# Patient Record
Sex: MALE | Race: WHITE | NOT HISPANIC OR LATINO | Employment: FULL TIME | URBAN - METROPOLITAN AREA
[De-identification: names, ages, dates, MRNs, and addresses within clinical notes are randomized per-mention and may not be internally consistent; named-entity substitution may affect disease eponyms.]

---

## 2018-01-11 ENCOUNTER — ALLSCRIPTS OFFICE VISIT (OUTPATIENT)
Dept: OTHER | Facility: OTHER | Age: 53
End: 2018-01-11

## 2018-01-11 ENCOUNTER — GENERIC CONVERSION - ENCOUNTER (OUTPATIENT)
Dept: OTHER | Facility: OTHER | Age: 53
End: 2018-01-11

## 2018-01-11 ENCOUNTER — TRANSCRIBE ORDERS (OUTPATIENT)
Dept: ADMINISTRATIVE | Facility: HOSPITAL | Age: 53
End: 2018-01-11

## 2018-01-11 DIAGNOSIS — F17.209 NICOTINE DEPENDENCE WITH NICOTINE-INDUCED DISORDER, UNSPECIFIED NICOTINE PRODUCT TYPE: ICD-10-CM

## 2018-01-11 DIAGNOSIS — G47.30 SLEEP APNEA, UNSPECIFIED TYPE: Primary | ICD-10-CM

## 2018-01-11 DIAGNOSIS — E66.9 OBESITY, UNSPECIFIED CLASSIFICATION, UNSPECIFIED OBESITY TYPE, UNSPECIFIED WHETHER SERIOUS COMORBIDITY PRESENT: ICD-10-CM

## 2018-01-15 ENCOUNTER — ALLSCRIPTS OFFICE VISIT (OUTPATIENT)
Dept: OTHER | Facility: OTHER | Age: 53
End: 2018-01-15

## 2018-01-23 VITALS
BODY MASS INDEX: 46.65 KG/M2 | HEART RATE: 76 BPM | WEIGHT: 315 LBS | TEMPERATURE: 99.1 F | DIASTOLIC BLOOD PRESSURE: 60 MMHG | SYSTOLIC BLOOD PRESSURE: 84 MMHG | RESPIRATION RATE: 16 BRPM | HEIGHT: 69 IN

## 2018-01-23 NOTE — MISCELLANEOUS
Message  Phone call to patient spoke with his wife who was with him in the office yesterday during his appointment  She states that patient is feeling better today since holding lisinopril and HCTZ  He denies lightheadedness or fatigue  Patient has moved up his appointment with cardiologist to this Friday 01/19/2018        Signatures   Electronically signed by : Hubert Kim DO; Jan 16 2018 10:45AM EST                       (Author)

## 2018-01-24 VITALS — TEMPERATURE: 98.3 F | BODY MASS INDEX: 46.65 KG/M2 | HEIGHT: 69 IN | WEIGHT: 315 LBS

## 2018-01-24 VITALS — DIASTOLIC BLOOD PRESSURE: 68 MMHG | RESPIRATION RATE: 18 BRPM | SYSTOLIC BLOOD PRESSURE: 110 MMHG | HEART RATE: 76 BPM

## 2018-02-11 PROBLEM — I21.3 STEMI (ST ELEVATION MYOCARDIAL INFARCTION) (HCC): Status: ACTIVE | Noted: 2018-01-09

## 2018-02-11 PROBLEM — I48.91 ATRIAL FIBRILLATION, NEW ONSET (HCC): Status: ACTIVE | Noted: 2018-02-11

## 2018-02-11 PROBLEM — I10 HYPERTENSION: Status: ACTIVE | Noted: 2018-01-11

## 2018-02-11 PROBLEM — G47.30 SLEEP APNEA: Status: ACTIVE | Noted: 2018-01-11

## 2018-02-11 PROBLEM — F17.200 NICOTINE DEPENDENCE: Status: ACTIVE | Noted: 2018-01-11

## 2018-02-20 ENCOUNTER — TELEPHONE (OUTPATIENT)
Dept: FAMILY MEDICINE CLINIC | Facility: CLINIC | Age: 53
End: 2018-02-20

## 2018-02-20 NOTE — TELEPHONE ENCOUNTER
As an FYI: Kwesi Boyce called stating Amado Antony had a heart attack about 3 weeks ago and currently has a heavy feeling on the chest as well as an elevated BP in the 180's  I suggested that if he feels a heavy feeling on the chest as well as trouble breathing they should go to the ER

## 2018-02-23 ENCOUNTER — TRANSITIONAL CARE MANAGEMENT (OUTPATIENT)
Dept: FAMILY MEDICINE CLINIC | Facility: CLINIC | Age: 53
End: 2018-02-23

## 2018-02-26 NOTE — MISCELLANEOUS
Assessment    1  Hypertension (401 9) (I10)   2  STEMI (ST elevation myocardial infarction) (410 90) (I21 3)   3  Nicotine dependence (305 1) (F17 200)   4  Obesity (278 00) (E66 9)   5  Sleep apnea (780 57) (G47 30)    Plan  Nicotine dependence    · Chantix Continuing Month Christian 1 MG Oral Tablet; TAKE AS DIRECTED PER  PACKAGE INSTRUCTIONS   Rx By: Jose Luis Ortez; Dispense: 0 Days ; #:1 X 56 Tablet Disp Pack; Refill: 4; For: Nicotine dependence; MARIETTA = N; Verified Transmission to [facility]; Last Updated By: System, SureScripts; 1/11/2018 4:09:03 PM   · Chantix Starting Month Christian 0 5 MG X 11 & 1 MG X 42 Oral Tablet; TAKE AS  DIRECTED PER PACKAGE INSTRUCTIONS   Rx By: Jose Luis Ortez; Dispense: 0 Days ; #:1 X 53 Tablet Disp Pack; Refill: 0; For: Nicotine dependence; MARIETTA = N; Verified Transmission to [facility] #[facility]; Last Updated By: System, SureScripts; 1/11/2018 4:09:03 PM  Nicotine dependence, Obesity, Sleep apnea    · *Polysomnography, Sleep Study, Diagnostic; Status:Need Information - Financial  Authorization; Requested PIW:[id number]; Perform:[facility]; VSW:[id number];JUAN;  For:Nicotine dependence, Obesity, Sleep apnea; Ordered By:Matthew Weldon;  there any other medical conditions or medications that would explain these      symptoms? : No  is the sleep disturbance affecting the patient's ability to function? : fatigue  History/Symptoms: : snoring, apnea, choking  Study Only or Consult : Sleep Study and Consult and F/U with Sleep Specialist    Discussion/Summary  Discussion Summary:   Patient will be scheduled for sleep study  Patient was started on Chantix  Patient to continue present treatment  Patient instructed to follow a low-fat, low-salt and a low-carbohydrate diet and get regular exercise walking as tolerated  Weight loss strongly encouraged and patient encouraged to continue off cigarettes  Discussed lifestyle changes and patient states he is motivated   Patient to follow up with Cardiology and pulmonology as scheduled and return to the office in 3 months  Medication SE Review and Pt Understands Tx: Possible side effects of new medications were reviewed with the patient/guardian today  The treatment plan was reviewed with the patient/guardian  The patient/guardian understands and agrees with the treatment plan      Chief Complaint  Chief Complaint Free Text Note Form: Hospital Follow Up      History of Present Illness  TCM Communication [facility]: The patient is being contacted for follow-up after hospitalization and [facility]  He was hospitalized at [facility]  The dates of hospitalization: January 4, 2018 through January 8,2018, date of admission: January 4, 2018, date of discharge: January 8, 2018  Diagnosis: Heart attack, STEMI, HTN  He was discharged to home  Medications reviewed and updated today  He scheduled a follow up appointment  Follow-up appointments with other specialists: Cardiol 1/22/18, Pulm 3/2/18  Symptoms: cough, back pain left side and back pain right side, but no fever, no weakness, no dizziness, no headache, no fatigue, no shortness of breath, no chest pain, no arm pain left side, no arm pain on right side, no leg pain on left side, no leg pain on right side, no upper abdominal pain, no middle abdominal pain, no lower abdominal pain, no rash:, no anorexia, no nausea, no vomiting, no loose stools, no constipation, no pain with urinating and no swelling  The patient is currently experiencing symptoms  Counseling was provided to the patient  Topics counseled included importance of compliance with treatment  Communication performed and completed by Kaur Foote   HPI: Patient is being seen in follow-up from recent hospitalization at [facility] from 01/04/2018 until 01/08/2018 for STEMI and hypertension  Patient had cardiac catheterization performed but stent was unable to be placed   Patient was told he was screened for sleep apnea and tested positive and was recommended to have a sleep study as an outpatient  Patient had been smoking 2 packs of cigarettes per day and stop cold turkey and was not offered nicotine patches or Chantix  He admits to feeling irritable and requests prescription for Chantix  Patient has a follow-up appointment with Cardiology on 01/22/2018 and they discussed cardiac rehab  Patient has a follow-up appointment with pulmonology on 03/02/2018 and a request sleep study prior to appointment  Active Problems    1  Bilateral edema of lower extremity (782 3) (R60 0)   2  Cutaneous skin tags (701 9) (L91 8)   3  Elbow contusion (923 11) (S50 00XA)   4  Elbow pain, right (719 42) (M25 521)   5  Elbow swelling (719 02) (M25 429)   6  Esophageal reflux (530 81) (K21 9)   7  Laceration of left knee, initial encounter (891 0) (S81 012A)   8  Lumbago (724 2) (M54 5)   9  Median nerve neuritis (354 1) (G56 10)   10  Obesity (278 00) (E66 9)   11  Radial neuritis (723 4) (G56 30)   12  Wound of skin (782 9) (R23 8)    Family History  Father    1  No pertinent family history    Social History    · Current Every Day Smoker (305 1)   · Former smoker (V15 82) (R10 066)    Current Meds   1  Albuterol 90 MCG/ACT AERS; Therapy: (Recorded:11Jan2018) to Recorded   2  Aspirin 81 MG Oral Tablet Chewable; CHEW 1 TABLET DAILY; Therapy: (Recorded:11Jan2018) to Recorded   3  Atorvastatin Calcium 80 MG Oral Tablet; Take 1 tablet daily; Therapy: (Recorded:11Jan2018) to Recorded   4  Carvedilol 12 5 MG Oral Tablet; Take 1 tablet twice daily with meals; Therapy: (Recorded:11Jan2018) to Recorded   5  Clopidogrel Bisulfate 75 MG Oral Tablet; Take 1 tablet daily; Therapy: (Recorded:11Jan2018) to Recorded   6  Fluticasone-Salmeterol 250-50 MCG/DOSE MISC; INHALE 1 PUFFS Twice daily; Therapy: (Recorded:11Jan2018) to Recorded   7  HydroCHLOROthiazide 25 MG Oral Tablet; Take 1 tablet daily;    Therapy: (Recorded:11Jan2018) to Recorded   8  Lisinopril 20 MG Oral Tablet; Take 1 tablet daily; Therapy: (Recorded:11Jan2018) to Recorded   9  Motrin  MG Oral Tablet; Therapy: (Recorded:05Mar2014) to Recorded   10  Nitroglycerin 0 4 MG Sublingual Tablet Sublingual; place 1 tablet under the tongue every    5 minutes up to 3 doses as needed for chest pain; Therapy: (Recorded:11Jan2018) to Recorded   11  Omeprazole 20 MG Oral Capsule Delayed Release; TAKE 1 CAPSULE Daily; Therapy: [id number] to (Evaluate:88Gsr6457)  Requested for: 41FXS6947; Last    Rx:05Mar2014 Ordered    Allergies    1  No Known Drug Allergies    Vitals  Signs   Recorded: 01CCO8240 04:02PM   Heart Rate: 76  Respiration: 18  Systolic: 357  Diastolic: 68  BP Cuff Size: Large  Recorded: 93TFQ4729 03:32PM   Temperature: 98 3 F  Height: 5 ft 9 in  Weight: 358 lb   BMI Calculated: 52 87  BSA Calculated: 2 65    Physical Exam    Constitutional   General appearance: No acute distress, well appearing and well nourished  Eyes   Conjunctiva and lids: No swelling, erythema, or discharge  Ears, Nose, Mouth, and Throat   External inspection of ears and nose: Normal     Otoscopic examination: Tympanic membrance translucent with normal light reflex  Canals patent without erythema  Nasal mucosa, septum, and turbinates: Normal without edema or erythema  Oropharynx: Normal with no erythema, edema, exudate or lesions  Pulmonary   Respiratory effort: No increased work of breathing or signs of respiratory distress  Auscultation of lungs: Clear to auscultation, equal breath sounds bilaterally, no wheezes, no rales, no rhonci  Cardiovascular   Auscultation of heart: Normal rate and rhythm, normal S1 and S2, without murmurs  Examination of extremities for edema and/or varicosities: Normal     Carotid pulses: Normal     Abdomen   Abdomen: Non-tender, no masses  The abdomen was obese     Lymphatic   Palpation of lymph nodes in neck: No lymphadenopathy  Musculoskeletal   Gait and station: Normal     Inspection/palpation of joints, bones, and muscles: Normal     Skin   Skin and subcutaneous tissue: Normal without rashes or lesions  Psychiatric   Orientation to person, place and time: Normal     Mood and affect: Normal          Results/Data  PHQ-2 Adult Depression Screening 78SDC2409 03:41PM User, s     Test Name Result Flag Reference   PHQ-2 Adult Depression Score 0     Over the last two weeks, how often have you been bothered by any of the following problems?   Little interest or pleasure in doing things: Not at all - 0  Feeling down, depressed, or hopeless: Not at all - 0   PHQ-2 Adult Depression Screening Negative         Signatures   Electronically signed by : Nelli Saavedra DO; Jan 11 2018  4:32PM EST                       (Author)

## 2018-02-28 ENCOUNTER — OFFICE VISIT (OUTPATIENT)
Dept: FAMILY MEDICINE CLINIC | Facility: CLINIC | Age: 53
End: 2018-02-28
Payer: COMMERCIAL

## 2018-02-28 VITALS
HEART RATE: 64 BPM | HEIGHT: 68 IN | TEMPERATURE: 99.1 F | DIASTOLIC BLOOD PRESSURE: 62 MMHG | SYSTOLIC BLOOD PRESSURE: 102 MMHG

## 2018-02-28 DIAGNOSIS — I48.91 ATRIAL FIBRILLATION, NEW ONSET (HCC): Primary | ICD-10-CM

## 2018-02-28 DIAGNOSIS — G47.33 OBSTRUCTIVE SLEEP APNEA SYNDROME: ICD-10-CM

## 2018-02-28 DIAGNOSIS — I10 ESSENTIAL HYPERTENSION: ICD-10-CM

## 2018-02-28 PROCEDURE — 99496 TRANSJ CARE MGMT HIGH F2F 7D: CPT | Performed by: FAMILY MEDICINE

## 2018-02-28 RX ORDER — ATORVASTATIN CALCIUM 80 MG/1
1 TABLET, FILM COATED ORAL DAILY
COMMUNITY

## 2018-02-28 RX ORDER — CARVEDILOL 12.5 MG/1
12.5 TABLET ORAL 2 TIMES DAILY
COMMUNITY
Start: 2018-02-22

## 2018-02-28 RX ORDER — NITROGLYCERIN 0.4 MG/1
1 TABLET SUBLINGUAL
COMMUNITY

## 2018-02-28 RX ORDER — SOTALOL HYDROCHLORIDE 120 MG/1
120 TABLET ORAL 2 TIMES DAILY
COMMUNITY
Start: 2018-02-22 | End: 2021-06-14

## 2018-02-28 RX ORDER — LISINOPRIL 5 MG/1
5 TABLET ORAL DAILY
COMMUNITY
Start: 2018-02-23 | End: 2018-11-12

## 2018-02-28 RX ORDER — CLOPIDOGREL BISULFATE 75 MG/1
1 TABLET ORAL DAILY
COMMUNITY

## 2018-03-01 NOTE — PROGRESS NOTES
Assessment/Plan:  Patient to continue present treatment  Patient to follow a low-fat, low-salt and a low-carbohydrate diet and get regular exercise walking as tolerated  Weight loss encouraged  Patient to follow up with cardiologist later this week as scheduled and scheduled for sleep study next week  Patient to return to the office p r n  No problem-specific Assessment & Plan notes found for this encounter  Diagnoses and all orders for this visit:    Atrial fibrillation, new onset [facility])  Comments:  Continue present treatment and follow up with Cardiology this week as scheduled  Essential hypertension  Comments:  BP well controlled  Continue present treatment  Obstructive sleep apnea syndrome  Comments:  Patient scheduled for sleep study next week  Recommend weight loss  Other orders  -     apixaban (ELIQUIS) 5 mg; Take 5 mg by mouth 2 (two) times a day  -     atorvastatin (LIPITOR) 80 mg tablet; Take 1 tablet by mouth daily  -     carvedilol (COREG) 12 5 mg tablet; Take 12 5 mg by mouth 2 (two) times a day  -     clopidogrel (PLAVIX) 75 mg tablet; Take 1 tablet by mouth daily  -     lisinopril (ZESTRIL) 5 mg tablet; Take 5 mg by mouth daily  -     nitroglycerin (NITROSTAT) 0 4 mg SL tablet; Place 1 tablet under the tongue every 5 (five) minutes as needed Chest pain  -     sotalol (BETAPACE) 120 mg tablet; Take 120 mg by mouth 2 (two) times a day          Subjective:      Patient ID: Lora Gonzales is a 48 y o  male  Patient is here for follow-up appointment from recent hospitalization at [facility] from 02/20/2018 until 02/22/2018 for recurrent atrial fibrillation  Patient received cardioversion for the 2nd time and started on Betapace with resolution of AFib currently  Patient has been feeling significantly better overall and return to work today  Patient has a follow-up appointment with Cardiology on 03/02/2018 and is scheduled for sleep study next week        Date and time hospital follow up call was made:  2/23/2018  1:54 PM  Patient was hopsitalized at:  formerly [facility]  Date of admission:  2/19/18  Date of discharge:  2/22/18  Diagnosis:  afib  Were the patients medicaitons reviewed and updated:  No  Scheduled for follow up?:  Yes  Is transportation to your appointments needed:  No  I have advised the patient to call PCP with any new or worsening symptoms (please type in name along with any credentials):  Issa Fofana  Counseling:  Patient         The following portions of the patient's history were reviewed and updated as appropriate: allergies, current medications, past family history, past medical history, past social history, past surgical history and problem list     Review of Systems   Constitutional: Positive for activity change  Negative for appetite change, fatigue and unexpected weight change  HENT: Negative  Eyes: Negative  Respiratory: Negative for cough, chest tightness, shortness of breath and wheezing  Cardiovascular: Negative for chest pain, palpitations and leg swelling  Gastrointestinal: Negative for abdominal pain, blood in stool, constipation, diarrhea and nausea  Genitourinary: Positive for urgency  Negative for difficulty urinating, dysuria, frequency and hematuria  Musculoskeletal: Positive for back pain  Negative for arthralgias, gait problem, joint swelling, myalgias and neck pain  Neurological: Negative for dizziness, syncope, light-headedness, numbness and headaches  Hematological: Negative for adenopathy  Does not bruise/bleed easily  Psychiatric/Behavioral: The patient is nervous/anxious  Objective:      /62 (BP Location: Left arm, Patient Position: Sitting, Cuff Size: Large)   Pulse 64   Temp 99 1 °F (37 3 °C) (Tympanic)   Ht 5' 8" (1 727 m)          Physical Exam   Constitutional: He is oriented to person, place, and time  He appears well-developed and well-nourished  No distress  HENT:   Head: Normocephalic  Mouth/Throat: Oropharynx is clear and moist    Eyes: Conjunctivae are normal    Neck: Neck supple  Cardiovascular: Normal rate and regular rhythm  Pulmonary/Chest: Effort normal and breath sounds normal    Abdominal: Soft  There is no tenderness  Musculoskeletal: He exhibits no edema  Lymphadenopathy:     He has no cervical adenopathy  Neurological: He is alert and oriented to person, place, and time  Skin: Skin is warm and dry  Psychiatric: He has a normal mood and affect

## 2018-03-01 NOTE — PATIENT INSTRUCTIONS
Continue present treatment  Follow a low-fat, low-salt and a low-carbohydrate diet and get regular exercise walking as tolerated  Weight loss encouraged  Follow up with Cardiology as scheduled and 4 year sleep study next week

## 2018-11-12 ENCOUNTER — OFFICE VISIT (OUTPATIENT)
Dept: FAMILY MEDICINE CLINIC | Facility: CLINIC | Age: 53
End: 2018-11-12
Payer: COMMERCIAL

## 2018-11-12 VITALS
OXYGEN SATURATION: 98 % | SYSTOLIC BLOOD PRESSURE: 130 MMHG | DIASTOLIC BLOOD PRESSURE: 90 MMHG | HEART RATE: 80 BPM | HEIGHT: 68 IN | TEMPERATURE: 99.2 F | RESPIRATION RATE: 16 BRPM | BODY MASS INDEX: 53.98 KG/M2

## 2018-11-12 DIAGNOSIS — R35.1 BENIGN PROSTATIC HYPERPLASIA WITH NOCTURIA: ICD-10-CM

## 2018-11-12 DIAGNOSIS — J20.9 ACUTE BRONCHITIS, UNSPECIFIED ORGANISM: Primary | ICD-10-CM

## 2018-11-12 DIAGNOSIS — N40.1 BENIGN PROSTATIC HYPERPLASIA WITH NOCTURIA: ICD-10-CM

## 2018-11-12 PROBLEM — R77.8 ELEVATED TROPONIN: Status: ACTIVE | Noted: 2018-09-12

## 2018-11-12 PROBLEM — I21.11 ST ELEVATION MYOCARDIAL INFARCTION INVOLVING RIGHT CORONARY ARTERY (HCC): Status: ACTIVE | Noted: 2018-01-09

## 2018-11-12 PROBLEM — I25.10 CORONARY ARTERY DISEASE INVOLVING NATIVE CORONARY ARTERY OF NATIVE HEART WITHOUT ANGINA PECTORIS: Status: ACTIVE | Noted: 2018-01-23

## 2018-11-12 PROBLEM — I20.8 ANGINA AT REST (HCC): Status: ACTIVE | Noted: 2018-02-20

## 2018-11-12 PROBLEM — I48.0 PAF (PAROXYSMAL ATRIAL FIBRILLATION) (HCC): Status: ACTIVE | Noted: 2018-02-11

## 2018-11-12 PROBLEM — F17.200 TOBACCO USE DISORDER: Status: ACTIVE | Noted: 2018-01-04

## 2018-11-12 PROBLEM — R79.89 ELEVATED TROPONIN: Status: ACTIVE | Noted: 2018-09-12

## 2018-11-12 PROBLEM — I20.89 ANGINA AT REST: Status: ACTIVE | Noted: 2018-02-20

## 2018-11-12 PROCEDURE — 99213 OFFICE O/P EST LOW 20 MIN: CPT | Performed by: FAMILY MEDICINE

## 2018-11-12 PROCEDURE — 1036F TOBACCO NON-USER: CPT | Performed by: FAMILY MEDICINE

## 2018-11-12 RX ORDER — TAMSULOSIN HYDROCHLORIDE 0.4 MG/1
0.4 CAPSULE ORAL
Qty: 90 CAPSULE | Refills: 3 | Status: SHIPPED | OUTPATIENT
Start: 2018-11-12 | End: 2021-06-14

## 2018-11-12 RX ORDER — METHYLPREDNISOLONE 4 MG/1
TABLET ORAL
Qty: 21 TABLET | Refills: 0 | Status: SHIPPED | OUTPATIENT
Start: 2018-11-12 | End: 2019-08-30 | Stop reason: ALTCHOICE

## 2018-11-12 RX ORDER — TAMSULOSIN HYDROCHLORIDE 0.4 MG/1
0.4 CAPSULE ORAL
COMMUNITY
Start: 2018-09-12 | End: 2018-11-12 | Stop reason: SDUPTHER

## 2018-11-12 RX ORDER — AZITHROMYCIN 250 MG/1
TABLET, FILM COATED ORAL
Qty: 6 TABLET | Refills: 0 | Status: SHIPPED | OUTPATIENT
Start: 2018-11-12 | End: 2018-11-16

## 2018-11-12 RX ORDER — LISINOPRIL 10 MG/1
TABLET ORAL
COMMUNITY
Start: 2018-10-29

## 2018-11-12 NOTE — PROGRESS NOTES
Assessment/Plan:      Patient will be started on a Z-Christian and Medrol Dosepak  Patient may take Mucinex DM p r n  He is encouraged to drink plenty of fluids and rest   Return to the office in 1 week or sooner p r n  [facility] Diagnoses and all orders for this visit:    Acute bronchitis, unspecified organism  Comments:    Z-Christian and Medrol Dosepak  Mucinex DM p r n   Increase fluids and rest   Orders:  -     azithromycin (ZITHROMAX) 250 mg tablet; Take 2 tablets today then 1 tablet daily x 4 days  -     Methylprednisolone 4 MG TBPK; Use as directed on package    Benign prostatic hyperplasia with nocturia  -     tamsulosin (FLOMAX) 0 4 mg; Take 1 capsule (0 4 mg total) by mouth daily with dinner    Other orders  -     lisinopril (ZESTRIL) 10 mg tablet;   -     Discontinue: tamsulosin (FLOMAX) 0 4 mg; Take 0 4 mg by mouth          Subjective:      Patient ID: Radha Yanes is a 48 y o  male  Patient started 1 week ago with head cold symptoms  He treated this with Coricidin HBP with some improvement  Past couple days complains of chest cold with cough productive of yellow-green mucus and wheezing  URI    This is a new problem  The current episode started in the past 7 days  The problem has been gradually worsening  There has been no fever  Associated symptoms include congestion, coughing and wheezing  Pertinent negatives include no ear pain, headaches, plugged ear sensation, rhinorrhea, sinus pain, sneezing or sore throat  Treatments tried: corisidan HBP  The treatment provided mild relief  The following portions of the patient's history were reviewed and updated as appropriate: allergies, current medications, past family history, past medical history, past social history, past surgical history and problem list     Review of Systems   HENT: Positive for congestion  Negative for ear pain, rhinorrhea, sinus pain, sneezing and sore throat  Respiratory: Positive for cough and wheezing      Neurological: Negative for headaches  Objective:      /90   Pulse 80   Temp 99 2 °F (37 3 °C) (Tympanic)   Resp 16   Ht 5' 8" (1 727 m)   SpO2 98%   BMI 53 98 kg/m²          Physical Exam   Constitutional: He is oriented to person, place, and time  He appears well-developed and well-nourished  No distress  HENT:   Head: Normocephalic  Right Ear: External ear normal    Left Ear: External ear normal      Positive turbinates swelling with mucoid drainage  Throat postnasal drainage and injected  Eyes: Conjunctivae are normal  No scleral icterus  Neck: Neck supple  Cardiovascular: Normal rate and regular rhythm  Pulmonary/Chest: Effort normal  No respiratory distress  He has wheezes  Scattered expiratory wheezes  Abdominal: Soft  There is no tenderness  Musculoskeletal: He exhibits no edema  Lymphadenopathy:     He has no cervical adenopathy  Neurological: He is alert and oriented to person, place, and time  Skin: Skin is warm and dry  Psychiatric: He has a normal mood and affect

## 2019-01-10 ENCOUNTER — TELEPHONE (OUTPATIENT)
Dept: FAMILY MEDICINE CLINIC | Facility: CLINIC | Age: 54
End: 2019-01-10

## 2019-01-10 DIAGNOSIS — Z72.0 TOBACCO ABUSE DISORDER: Primary | ICD-10-CM

## 2019-01-10 NOTE — TELEPHONE ENCOUNTER
Called patient and left message that colon cancer screening is due; it is recommended to have a colonoscopy every 10 years or to have a FIT test annually  Please call the office for a referral for colonoscopy or stop by office to  a FIT kit, which can be done at home with only one sample and then sent to a lab for processing

## 2019-01-11 DIAGNOSIS — F17.200 TOBACCO USE DISORDER: Primary | ICD-10-CM

## 2019-03-01 ENCOUNTER — TRANSITIONAL CARE MANAGEMENT (OUTPATIENT)
Dept: FAMILY MEDICINE CLINIC | Facility: CLINIC | Age: 54
End: 2019-03-01

## 2019-03-16 PROBLEM — R77.8 ELEVATED TROPONIN: Status: RESOLVED | Noted: 2018-09-12 | Resolved: 2019-03-16

## 2019-03-16 PROBLEM — R79.89 ELEVATED TROPONIN: Status: RESOLVED | Noted: 2018-09-12 | Resolved: 2019-03-16

## 2019-08-30 ENCOUNTER — OFFICE VISIT (OUTPATIENT)
Dept: FAMILY MEDICINE CLINIC | Facility: CLINIC | Age: 54
End: 2019-08-30
Payer: COMMERCIAL

## 2019-08-30 VITALS
SYSTOLIC BLOOD PRESSURE: 132 MMHG | TEMPERATURE: 97.8 F | HEIGHT: 69 IN | BODY MASS INDEX: 46.65 KG/M2 | DIASTOLIC BLOOD PRESSURE: 88 MMHG | WEIGHT: 315 LBS

## 2019-08-30 DIAGNOSIS — I48.0 PAF (PAROXYSMAL ATRIAL FIBRILLATION) (HCC): ICD-10-CM

## 2019-08-30 DIAGNOSIS — M54.5 LOW BACK PAIN, UNSPECIFIED BACK PAIN LATERALITY, UNSPECIFIED CHRONICITY, WITH SCIATICA PRESENCE UNSPECIFIED: Primary | ICD-10-CM

## 2019-08-30 DIAGNOSIS — I25.10 CORONARY ARTERY DISEASE INVOLVING NATIVE CORONARY ARTERY OF NATIVE HEART WITHOUT ANGINA PECTORIS: ICD-10-CM

## 2019-08-30 PROCEDURE — 3008F BODY MASS INDEX DOCD: CPT | Performed by: FAMILY MEDICINE

## 2019-08-30 PROCEDURE — 99214 OFFICE O/P EST MOD 30 MIN: CPT | Performed by: FAMILY MEDICINE

## 2019-08-30 RX ORDER — FUROSEMIDE 40 MG/1
40 TABLET ORAL 2 TIMES DAILY
COMMUNITY
Start: 2019-06-19 | End: 2020-06-18

## 2019-08-30 RX ORDER — PREDNISONE 10 MG/1
TABLET ORAL
Qty: 33 TABLET | Refills: 0 | Status: SHIPPED | OUTPATIENT
Start: 2019-08-30 | End: 2020-12-02

## 2019-08-30 RX ORDER — CYCLOBENZAPRINE HCL 10 MG
10 TABLET ORAL
Qty: 10 TABLET | Refills: 0 | Status: SHIPPED | OUTPATIENT
Start: 2019-08-30 | End: 2021-06-14

## 2019-08-30 NOTE — LETTER
Date: 8/30/2019    To whom it may concern: This is to certify that Lidia Oro has been under my care for the following diagnosis: Coronary artery disease  I feel that he is unable to serve on Jury Duty at this time for the above mentioned medical reasons                    Sincerely,   Audrey Dejesus DO

## 2019-08-30 NOTE — PROGRESS NOTES
Assessment/Plan:  Recommend physical therapy if no improvement in symptoms  Consider lumbar x-ray if needed or MRI if symptoms persist or worsen  We also discussed with patient and his wife possibly considering evaluation for weight reduction surgery considering obesity and comorbid conditions  He would need to be cleared by Cardiology prior to doing so  Recommend follow-up with our office next week if no improvement or worsening symptoms and he will call if any new symptoms develop in the interim  Note provided for jury duty today  See chart copy  No problem-specific Assessment & Plan notes found for this encounter  Diagnoses and all orders for this visit:    Low back pain, unspecified back pain laterality, unspecified chronicity, with sciatica presence unspecified  -     predniSONE 10 mg tablet; 6 tablets daily, all at one time, with food  Then on day #4 decrease by 1 pill each day until finished  -     cyclobenzaprine (FLEXERIL) 10 mg tablet; Take 1 tablet (10 mg total) by mouth daily at bedtime    Coronary artery disease involving native coronary artery of native heart without angina pectoris    [facility]    Other orders  -     apixaban (ELIQUIS) 5 mg; Take 1 tablet by mouth 2 (two) times a day  -     furosemide (LASIX) 40 mg tablet; Take 40 mg by mouth 2 (two) times a day          Subjective:      Patient ID: Tiesha Jj is a 47 y o  male  Patient is here with chief complaint of low back pain intermittently over the last several weeks  Symptoms are mild-to-moderate  He does have occasional radiculopathy symptoms to the right-hand side  Pain occasionally radiates to the right gluteal area and occasionally to the right lateral upper leg  Denies any leg weakness  No bowel or bladder incontinence  He does have history of cardiovascular disease and currently is taking Eliquis  He is requesting a letter for jury duty    He is scheduled to sit for jury duty on 09/03/2019  He does not believe he will be able to do so secondary to his cardiovascular issues  He has a difficult time sitting for prolonged periods and is currently on a diuretic  The following portions of the patient's history were reviewed and updated as appropriate: allergies, current medications, past family history, past medical history, past social history, past surgical history and problem list     Review of Systems   Constitutional: Negative  HENT: Negative  Eyes: Negative  Respiratory: Negative  Cardiovascular: Negative  Gastrointestinal: Negative  Endocrine: Negative  Genitourinary: Negative  Musculoskeletal: Positive for back pain  Skin: Negative  Allergic/Immunologic: Negative  Neurological: Negative  Hematological: Negative  Psychiatric/Behavioral: Negative  Objective:      /88 (BP Location: Left arm, Patient Position: Sitting, Cuff Size: Large)   Temp 97 8 °F (36 6 °C) (Tympanic)   Ht 5' 9" (1 753 m)   Wt (!) 173 kg (381 lb)   BMI 56 26 kg/m²          Physical Exam   Constitutional: He is oriented to person, place, and time  He appears well-developed and well-nourished  HENT:   Head: Normocephalic and atraumatic  Right Ear: External ear normal  Tympanic membrane is not erythematous and not bulging  Left Ear: External ear normal  Tympanic membrane is not erythematous and not bulging  Nose: Nose normal    Mouth/Throat: Oropharynx is clear and moist and mucous membranes are normal  No oral lesions  No oropharyngeal exudate  Eyes: Conjunctivae and EOM are normal  Right eye exhibits no discharge  Left eye exhibits no discharge  No scleral icterus  Neck: Normal range of motion  Neck supple  No thyromegaly present  Cardiovascular: Normal rate, regular rhythm and normal heart sounds  Exam reveals no gallop and no friction rub  No murmur heard  Pulmonary/Chest: Effort normal  No respiratory distress  He has no wheezes   He has no rales  He exhibits no tenderness  Abdominal: Soft  Bowel sounds are normal  He exhibits no distension and no mass  There is no tenderness  There is no rebound and no guarding  Musculoskeletal: Normal range of motion  He exhibits no edema, tenderness or deformity  Negative straight leg raise  Reflexes intact to patella and Achilles bilaterally  Lymphadenopathy:     He has no cervical adenopathy  Neurological: He is alert and oriented to person, place, and time  He has normal reflexes  No cranial nerve deficit  He exhibits normal muscle tone  Coordination normal    Skin: Skin is warm and dry  No rash noted  No erythema  No pallor  Psychiatric: He has a normal mood and affect  His behavior is normal    Vitals reviewed

## 2020-01-21 DIAGNOSIS — Z72.0 TOBACCO ABUSE DISORDER: ICD-10-CM

## 2020-12-01 ENCOUNTER — TELEPHONE (OUTPATIENT)
Dept: FAMILY MEDICINE CLINIC | Facility: CLINIC | Age: 55
End: 2020-12-01

## 2020-12-02 ENCOUNTER — OFFICE VISIT (OUTPATIENT)
Dept: URGENT CARE | Age: 55
End: 2020-12-02
Payer: COMMERCIAL

## 2020-12-02 ENCOUNTER — APPOINTMENT (OUTPATIENT)
Dept: RADIOLOGY | Age: 55
End: 2020-12-02
Payer: COMMERCIAL

## 2020-12-02 VITALS
SYSTOLIC BLOOD PRESSURE: 142 MMHG | OXYGEN SATURATION: 96 % | HEART RATE: 80 BPM | HEIGHT: 68 IN | RESPIRATION RATE: 22 BRPM | TEMPERATURE: 98 F | BODY MASS INDEX: 47.74 KG/M2 | WEIGHT: 315 LBS | DIASTOLIC BLOOD PRESSURE: 96 MMHG

## 2020-12-02 DIAGNOSIS — T14.90XA INJURY: ICD-10-CM

## 2020-12-02 DIAGNOSIS — S63.501A WRIST SPRAIN, RIGHT, INITIAL ENCOUNTER: Primary | ICD-10-CM

## 2020-12-02 PROCEDURE — 99213 OFFICE O/P EST LOW 20 MIN: CPT | Performed by: FAMILY MEDICINE

## 2020-12-02 PROCEDURE — 73110 X-RAY EXAM OF WRIST: CPT

## 2020-12-02 PROCEDURE — 73130 X-RAY EXAM OF HAND: CPT

## 2020-12-02 RX ORDER — PREDNISONE 50 MG/1
50 TABLET ORAL DAILY
Qty: 5 TABLET | Refills: 0 | Status: SHIPPED | OUTPATIENT
Start: 2020-12-02 | End: 2020-12-07

## 2021-03-01 ENCOUNTER — TELEPHONE (OUTPATIENT)
Dept: FAMILY MEDICINE CLINIC | Facility: CLINIC | Age: 56
End: 2021-03-01

## 2021-03-01 ENCOUNTER — TELEMEDICINE (OUTPATIENT)
Dept: FAMILY MEDICINE CLINIC | Facility: CLINIC | Age: 56
End: 2021-03-01
Payer: COMMERCIAL

## 2021-03-01 DIAGNOSIS — J06.9 UPPER RESPIRATORY TRACT INFECTION, UNSPECIFIED TYPE: Primary | ICD-10-CM

## 2021-03-01 PROCEDURE — 99213 OFFICE O/P EST LOW 20 MIN: CPT | Performed by: FAMILY MEDICINE

## 2021-03-01 RX ORDER — AZITHROMYCIN 250 MG/1
TABLET, FILM COATED ORAL
Qty: 6 TABLET | Refills: 0 | Status: SHIPPED | OUTPATIENT
Start: 2021-03-01 | End: 2021-03-05

## 2021-03-01 NOTE — PROGRESS NOTES
Virtual Brief Visit    Assessment/Plan:   discussed IUFDE-83 testing although patient declines at this time  Patient was started on a Z-Christian and may take Robitussin or Mucinex p r n  Recommend increase fluids and rest   Follow-up later this week or call sooner p r n   If symptoms persist again will recommend COVID-19 testing  Problem List Items Addressed This Visit     None      Visit Diagnoses     Upper respiratory tract infection, unspecified type    -  Primary    Relevant Medications    azithromycin (ZITHROMAX) 250 mg tablet                Reason for visit is No chief complaint on file  Encounter provider Shayne Fuller DO    Provider located at [address] [place] [address] [id number]    Recent Visits  No visits were found meeting these conditions  Showing recent visits within past 7 days and meeting all other requirements     Today's Visits  Date Type Provider Dept   03/01/21 Telemedicine Shayne Fuller DO [facility] [place]   Showing today's visits and meeting all other requirements     Future Appointments  No visits were found meeting these conditions  Showing future appointments within next 150 days and meeting all other requirements        After connecting through telephone, the patient was identified by name and date of birth  Dianelys Willard was informed that this is a telemedicine visit and that the visit is being conducted through telephone  My office door was closed  No one else was in the room  He acknowledged consent and understanding of privacy and security of the platform  The patient has agreed to participate and understands he can discontinue the visit at any time  Patient is aware this is a billable service  Subjective    Dianelys Willard is a 64 y o  male complains of nasal congestion, runny nose productive of clear to yellowish mucus for the past 1 week  He denies postnasal drainage, sore throat or earache    He admits to occasional cough productive of yellowish phlegm and wheezing  He denies shortness of breath  Patient denies headache or body aches  He denies fever, chills or sweats  Patient denies loss of sense of smell although admits to abnormal sense of taste since December of 2019 when he had a bad cold  Patient denies nausea, vomiting or diarrhea  He denies rash or red eyes  No recent travel or ill contacts or known COVID exposure  Patient's wife was positive for COVID in December of 2020  HPI     No past medical history on file  No past surgical history on file  Current Outpatient Medications   Medication Sig Dispense Refill    apixaban (ELIQUIS) 5 mg Take 1 tablet by mouth 2 (two) times a day      atorvastatin (LIPITOR) 80 mg tablet Take 1 tablet by mouth daily      azithromycin (ZITHROMAX) 250 mg tablet Take 2 tablets today then 1 tablet daily x 4 days 6 tablet 0    carvedilol (COREG) 12 5 mg tablet Take 12 5 mg by mouth 2 (two) times a day      clopidogrel (PLAVIX) 75 mg tablet Take 1 tablet by mouth daily      cyclobenzaprine (FLEXERIL) 10 mg tablet Take 1 tablet (10 mg total) by mouth daily at bedtime 10 tablet 0    fluticasone-salmeterol (ADVAIR, WIXELA) 250-50 mcg/dose inhaler INHALE ONE PUFF TWICE DAILY; rinse mouth after use 60 each 0    lisinopril (ZESTRIL) 10 mg tablet       nitroglycerin (NITROSTAT) 0 4 mg SL tablet Place 1 tablet under the tongue every 5 (five) minutes as needed Chest pain      sotalol (BETAPACE) 120 mg tablet Take 120 mg by mouth 2 (two) times a day      tamsulosin (FLOMAX) 0 4 mg Take 1 capsule (0 4 mg total) by mouth daily with dinner (Patient not taking: Reported on 8/30/2019) 90 capsule 3     No current facility-administered medications for this visit  No Known Allergies    Review of Systems    There were no vitals filed for this visit        I spent 15 minutes directly with the patient during this visit    VIRTUAL VISIT Kai Sanches acknowledges that he has consented to an online visit or consultation  He understands that the online visit is based solely on information provided by him, and that, in the absence of a face-to-face physical evaluation by the physician, the diagnosis he receives is both limited and provisional in terms of accuracy and completeness  This is not intended to replace a full medical face-to-face evaluation by the physician  Raine Caballero understands and accepts these terms

## 2021-03-01 NOTE — TELEPHONE ENCOUNTER
LMOM for pt to call back  We do not have an updated communication on file so we can not give any information to wife

## 2021-03-01 NOTE — TELEPHONE ENCOUNTER
Wendi Moeller is calling asking if the pt can take Mucinex (if he is able to take a decongestant) due to his high blood pressure can it be the DM  Please advise        [phone]

## 2021-06-03 ENCOUNTER — VBI (OUTPATIENT)
Dept: ADMINISTRATIVE | Facility: OTHER | Age: 56
End: 2021-06-03

## 2021-06-14 ENCOUNTER — APPOINTMENT (OUTPATIENT)
Dept: RADIOLOGY | Facility: MEDICAL CENTER | Age: 56
End: 2021-06-14
Payer: COMMERCIAL

## 2021-06-14 ENCOUNTER — OFFICE VISIT (OUTPATIENT)
Dept: URGENT CARE | Facility: MEDICAL CENTER | Age: 56
End: 2021-06-14
Payer: COMMERCIAL

## 2021-06-14 VITALS
BODY MASS INDEX: 47.74 KG/M2 | RESPIRATION RATE: 16 BRPM | SYSTOLIC BLOOD PRESSURE: 134 MMHG | DIASTOLIC BLOOD PRESSURE: 88 MMHG | WEIGHT: 315 LBS | HEIGHT: 68 IN | HEART RATE: 100 BPM | OXYGEN SATURATION: 96 % | TEMPERATURE: 97.8 F

## 2021-06-14 DIAGNOSIS — M25.561 RIGHT KNEE PAIN, UNSPECIFIED CHRONICITY: Primary | ICD-10-CM

## 2021-06-14 DIAGNOSIS — M25.561 RIGHT KNEE PAIN, UNSPECIFIED CHRONICITY: ICD-10-CM

## 2021-06-14 PROCEDURE — 99213 OFFICE O/P EST LOW 20 MIN: CPT | Performed by: FAMILY MEDICINE

## 2021-06-14 PROCEDURE — 73564 X-RAY EXAM KNEE 4 OR MORE: CPT

## 2021-06-14 RX ORDER — ATORVASTATIN CALCIUM 80 MG/1
80 TABLET, FILM COATED ORAL DAILY
COMMUNITY
Start: 2021-03-16

## 2021-06-14 RX ORDER — FUROSEMIDE 40 MG/1
TABLET ORAL
COMMUNITY
Start: 2021-06-03

## 2021-06-14 RX ORDER — AMIODARONE HYDROCHLORIDE 200 MG/1
200 TABLET ORAL DAILY
COMMUNITY
Start: 2021-03-16

## 2021-06-14 RX ORDER — AMIODARONE HYDROCHLORIDE 200 MG/1
TABLET ORAL
COMMUNITY
Start: 2021-03-16

## 2021-06-14 RX ORDER — LISINOPRIL 10 MG/1
TABLET ORAL
COMMUNITY
Start: 2021-03-16

## 2021-06-14 NOTE — PROGRESS NOTES
Assessment/Plan:  Right knee pain  Differential includes injury to medial meniscus or medial collateral ligament  No acute fracture seen on xray  Some degenerative changes seen  Advised to continue tylenol for pain relief as needed  Continue to alternate between ice and heat  Knee immobilizer placed today  Referral given to ortho  Diagnoses and all orders for this visit:    Right knee pain, unspecified chronicity  -     XR knee 4+ vw right injury; Future    Other orders  -     amiodarone 200 mg tablet; Take 200 mg by mouth daily  -     amiodarone 200 mg tablet  -     furosemide (LASIX) 40 mg tablet  -     atorvastatin (LIPITOR) 80 mg tablet; Take 80 mg by mouth daily  -     lisinopril (ZESTRIL) 10 mg tablet; TAKE ONE TABLET BY MOUTH DAILY  Subjective: Amada Donnelly is a 64year old male with a PMH of CAD and a fib on eqliquis who presents with medial right knee pain after tripping over a log in his backyard and falling directly on his right knee 2 weeks ago  No head injury  He describes the initial pain as burning  He has been using ice, heat, elevation, and tylenol without much relief  Rates the pain a 10/10 with internal and external stress and 6/10 at rest  He is able to bear weight  No hx of previous injury to this knee  Patient ID: Mati Mcdonald is a 64 y o  male  Mati Mcdonald is a 64year old male with a PMH of CAD and a fib on eliquis who presents with right knee pain after tripping over a log in his backyard and falling directly on his right knee 2 weeks ago         The following portions of the patient's history were reviewed and updated as appropriate: allergies, current medications, past family history, past medical history, past social history, past surgical history and problem list     Review of Systems   Musculoskeletal:        Medial right knee pain         Objective:      /88   Pulse 100   Temp 97 8 °F (36 6 °C)   Resp 16   Ht 5' 8" (1 727 m)   Wt (!) 177 kg (390 lb)   SpO2 96%   BMI 59 30 kg/m²          Physical Exam  Constitutional:       General: He is not in acute distress  Appearance: He is obese  He is not ill-appearing  Musculoskeletal:      Comments: No swelling, erythema, or ecchymosis of knees b/l  Pain with palpation of medial aspect of right knee  Pain with varus and valgus stress of right knee  Decreased ROM to 30 degrees flexion of right knee due to pain  Some laxity of medial aspect of right knee  Able to bear weight  Negative anterior/posterior drawer   Negative Lachmans  Positive McMurrays      Neurological:      Mental Status: He is alert

## 2021-06-14 NOTE — LETTER
June 14, 2021     Patient: Rachel Sampson   YOB: 1965   Date of Visit: 6/14/2021       To Whom It May Concern: It is my medical opinion that Rachel Sampson may return to work on 6/15/2021     If you have any questions or concerns, please don't hesitate to call           Sincerely,        Kingston Claude, MD    CC: No Recipients

## 2021-06-14 NOTE — PATIENT INSTRUCTIONS
Right knee x-ray revealed mild to moderate arthritic changes medial aspect  There is also degenerative changes of the patellar area but no acute fracture subluxation observed  Patient placed in a knee immobilizer, advised to alternate between ice and heat, may continue with Tylenol  He is to keep leg elevated when possible  He is also given outpatient orthopedic referral     Knee Pain   WHAT YOU NEED TO KNOW:   Knee pain may start suddenly, or it may be a long-term problem  You may have pain on the side, front, or back of your knee  You may have knee stiffness and swelling  You may hear popping sounds or feel like your knee is giving way or locking up as you walk  You may feel pain when you sit, stand, walk, or climb up and down stairs  Knee pain can be caused by conditions such as obesity, inflammation, or strains or tears in ligaments or tendons  DISCHARGE INSTRUCTIONS:   Return to the emergency department if:   · Your pain is worse, even after treatment  · You cannot bend or straighten your leg completely  · The swelling around your knee does not go down even with treatment  · Your knee is painful and hot to the touch  Contact your healthcare provider if:   · You have questions or concerns about your condition or care  Medicines: You may need any of the following:  · NSAIDs  help decrease swelling and pain or fever  This medicine is available with or without a doctor's order  NSAIDs can cause stomach bleeding or kidney problems in certain people  If you take blood thinner medicine, always ask your healthcare provider if NSAIDs are safe for you  Always read the medicine label and follow directions  · Acetaminophen  decreases pain and fever  It is available without a doctor's order  Ask how much to take and how often to take it  Follow directions   Read the labels of all other medicines you are using to see if they also contain acetaminophen, or ask your doctor or pharmacist  Acetaminophen can cause liver damage if not taken correctly  Do not use more than 4 grams (4,000 milligrams) total of acetaminophen in one day  · Prescription pain medicine  may be given  Ask your healthcare provider how to take this medicine safely  Some prescription pain medicines contain acetaminophen  Do not take other medicines that contain acetaminophen without talking to your healthcare provider  Too much acetaminophen may cause liver damage  Prescription pain medicine may cause constipation  Ask your healthcare provider how to prevent or treat constipation  · Take your medicine as directed  Contact your healthcare provider if you think your medicine is not helping or if you have side effects  Tell him or her if you are allergic to any medicine  Keep a list of the medicines, vitamins, and herbs you take  Include the amounts, and when and why you take them  Bring the list or the pill bottles to follow-up visits  Carry your medicine list with you in case of an emergency  What you can do to manage your symptoms:   · Rest your knee so it can heal   Limit activities that increase your pain  Do low-impact exercises, such as walking or swimming  · Apply ice to help reduce swelling and pain  Use an ice pack, or put crushed ice in a plastic bag  Cover it with a towel before you apply it to your knee  Apply ice for 15 to 20 minutes every hour, or as directed  · Apply compression to help reduce swelling  Use a brace or bandage only as directed  · Elevate your knee to help decrease pain and swelling  Elevate your knee while you are sitting or lying down  Prop your leg on pillows to keep your knee above the level of your heart  · Prevent your knee from moving as directed  Your healthcare provider may put on a cast or splint  You may need to wear a leg brace to stabilize your knee  A leg brace can be adjusted to increase your range of motion as your knee heals         What you can do to prevent knee pain:   · Maintain a healthy weight  Extra weight increases your risk for knee pain  Ask your healthcare provider how much you should weigh  He or she can help you create a safe weight loss plan if you need to lose weight  · Exercise or train properly  Use the correct equipment for sports  Wear shoes that provide good support  Check your posture often as you exercise, play sports, or train for an event  This can help prevent stress and strain on your knees  Rest between sessions so you do not overwork your knees  Follow up with your healthcare provider within 24 hours or as directed: You may need follow-up treatments, such as steroid injections to decrease pain  Write down your questions so you remember to ask them during your visits  © Copyright [address] Information is for End User's use only and may not be sold, redistributed or otherwise used for commercial purposes  All illustrations and images included in CareNotes® are the copyrighted property of [employer]  or [facility] Peyton Fuentes   The above information is an  only  It is not intended as medical advice for individual conditions or treatments  Talk to your doctor, nurse or pharmacist before following any medical regimen to see if it is safe and effective for you

## 2021-06-14 NOTE — PROGRESS NOTES
[employer] [facility]        NAME: Angy Wilson is a 64 y o  male  : 1965    MRN: [id number]  DATE: 2021  TIME: 5:58 PM    Assessment and Plan   Right knee pain, unspecified chronicity [M25 561]  1  Right knee pain, unspecified chronicity  XR knee 4+ vw right injury    Ambulatory referral to Orthopedic Surgery         Patient Instructions       Follow up with PCP in 3-5 days  Proceed to  ER if symptoms worsen  Chief Complaint     Chief Complaint   Patient presents with    Knee Pain     Pt here for knee pain  Pt fell in his yard a few days ago and since then is c/o  pain with certain movements  History of Present Illness        44-year-old male here today with 2 week history of right knee pain  Patient informs me that 2 weeks ago, while working at home, he slipped on logs landing directly on his right knee  Describes pain was burning in nature initially however denies any effusion or ecchymosis at that time  Pain is predominant worse on the medial aspect  He has been taking Tylenol, applying ice alternate way he with no significant improvement  Denies previous injury to the right knee in the past   Past medical history CAD, status post MI, hyperlipidemia      Review of Systems   Review of Systems   Constitutional: Negative  Musculoskeletal: Positive for arthralgias  Current Medications       Current Outpatient Medications:     amiodarone 200 mg tablet, Take 200 mg by mouth daily, Disp: , Rfl:     atorvastatin (LIPITOR) 80 mg tablet, Take 80 mg by mouth daily, Disp: , Rfl:     lisinopril (ZESTRIL) 10 mg tablet, TAKE ONE TABLET BY MOUTH DAILY  , Disp: , Rfl:     amiodarone 200 mg tablet, , Disp: , Rfl:     apixaban (ELIQUIS) 5 mg, Take 1 tablet by mouth 2 (two) times a day, Disp: , Rfl:     atorvastatin (LIPITOR) 80 mg tablet, Take 1 tablet by mouth daily, Disp: , Rfl:     carvedilol (COREG) 12 5 mg tablet, Take 12 5 mg by mouth 2 (two) times a day, Disp: , Rfl:   clopidogrel (PLAVIX) 75 mg tablet, Take 1 tablet by mouth daily, Disp: , Rfl:     furosemide (LASIX) 40 mg tablet, , Disp: , Rfl:     lisinopril (ZESTRIL) 10 mg tablet, , Disp: , Rfl:     nitroglycerin (NITROSTAT) 0 4 mg SL tablet, Place 1 tablet under the tongue every 5 (five) minutes as needed Chest pain, Disp: , Rfl:     Current Allergies     Allergies as of 06/14/2021    (No Known Allergies)            The following portions of the patient's history were reviewed and updated as appropriate: allergies, current medications, past family history, past medical history, past social history, past surgical history and problem list      Past Medical History:   Diagnosis Date    Myocardial infarct [facility])        History reviewed  No pertinent surgical history  Family History   Problem Relation Age of Onset    No Known Problems Father     Heart disease Mother          Medications have been verified  Objective   /88   Pulse 100   Temp 97 8 °F (36 6 °C)   Resp 16   Ht 5' 8" (1 727 m)   Wt (!) 177 kg (390 lb)   SpO2 96%   BMI 59 30 kg/m²   No LMP for male patient  Physical Exam     Physical Exam  Vitals and nursing note reviewed  Constitutional:       Appearance: He is obese  Musculoskeletal:         General: Tenderness present  Comments: Right lower extremity: Flexion to 30 degrees, extension is 0 degrees, anterior drawer sign - negative  Antoine's test positive on external rotation  Pain elicited on valgus stress with laxity of the medial collateral ligament  No effusion or ecchymosis observed  There is tenderness over the right popliteal right medial knee aspect  Neurological:      Mental Status: He is alert

## 2021-06-21 ENCOUNTER — APPOINTMENT (OUTPATIENT)
Dept: RADIOLOGY | Facility: MEDICAL CENTER | Age: 56
End: 2021-06-21
Payer: COMMERCIAL

## 2021-06-21 ENCOUNTER — OFFICE VISIT (OUTPATIENT)
Dept: OBGYN CLINIC | Facility: MEDICAL CENTER | Age: 56
End: 2021-06-21
Payer: COMMERCIAL

## 2021-06-21 VITALS
TEMPERATURE: 97.8 F | HEIGHT: 68 IN | BODY MASS INDEX: 47.74 KG/M2 | DIASTOLIC BLOOD PRESSURE: 94 MMHG | WEIGHT: 315 LBS | SYSTOLIC BLOOD PRESSURE: 152 MMHG | HEART RATE: 79 BPM

## 2021-06-21 DIAGNOSIS — M17.11 PRIMARY OSTEOARTHRITIS OF RIGHT KNEE: ICD-10-CM

## 2021-06-21 DIAGNOSIS — M25.561 RIGHT KNEE PAIN, UNSPECIFIED CHRONICITY: ICD-10-CM

## 2021-06-21 DIAGNOSIS — M23.91 INTERNAL DERANGEMENT OF RIGHT KNEE: Primary | ICD-10-CM

## 2021-06-21 PROCEDURE — 3008F BODY MASS INDEX DOCD: CPT | Performed by: ORTHOPAEDIC SURGERY

## 2021-06-21 PROCEDURE — 99243 OFF/OP CNSLTJ NEW/EST LOW 30: CPT | Performed by: ORTHOPAEDIC SURGERY

## 2021-06-21 PROCEDURE — 73564 X-RAY EXAM KNEE 4 OR MORE: CPT

## 2021-06-21 NOTE — PROGRESS NOTES
Assessment/Plan     1  Internal derangement of right knee    2  Right knee pain, unspecified chronicity    3  Primary osteoarthritis of right knee      Orders Placed This Encounter   Procedures    XR knee 4+ vw right injury    MRI knee right  wo contrast     · Patient has moderate-severe right knee osteoarthritis with possible ligament strain due to a fall 4 weeks ago  · Will be ordering MRI right knee to r/o soft tissue pathology   · Continue taking Tylenol as needed for pain   May take up to 3000 mg a day  Patient cannot take NSAIDs due to being on Plavix  · May use over the counter Voltaren gel  as needed for pain  ·  may consider a right knee steroid injection next office visit  Patient is aware the dry skin around his right knee needs to be improved before having a steroid injection  Return for Discuss MRI Right knee results   I answered all of the patient's questions during the visit and provided education of the patient's condition during the visit  The patient verbalized understanding of the information given and agrees with the plan  This note was dictated using MobileAware software  It may contain errors including improperly dictated words  Please contact physician directly for any questions  History of Present Illness   Chief complaint:   Chief Complaint   Patient presents with    Right Knee - Pain       HPI: Carlota Taylor is a 64 y o  male that c/o right knee pain  He was referred by urgent care Dr Marvin Haddad  He states he had a fall about 4  weeks ago  He states while working at home, he slipped on dog poo  and fell directly on his right knee  He states he started having increased burning pain in the anteromedial aspect of the right knee  Patient went to urgent care and had x-rays taken of the right knee which showed no acute fractures and was placed in a knee immobilizer  He states he is having constant burning and sharp pain over with the anteromedial aspect of the right knee    He denies instability or locking  Pain is worse with pivoting, lateral movements and with walking  He has been taking Tylenol as needed for pain with no relief  Patient cannot take NSAIDs due to being on Plavix  Patient also has been using ice and heat and states with no relief  He has no history having any injections, physical therapy or surgeries on the right knee  He has not been vaccinated for COVID  ROS:    See HPI for musculoskeletal review  All other systems reviewed are negative     Historical Information   Past Medical History:   Diagnosis Date    Myocardial infarct ([facility] [address] )      No past surgical history on file  Social History   Social History     Substance and Sexual Activity   Alcohol Use No     Social History     Substance and Sexual Activity   Drug Use No     Social History     Tobacco Use   Smoking Status Current Every Day Smoker    Last attempt to quit: 1/4/2018    Years since quitting: 3 4   Smokeless Tobacco Never Used     Family History:   Family History   Problem Relation Age of Onset    No Known Problems Father     Heart disease Mother        Current Outpatient Medications on File Prior to Visit   Medication Sig Dispense Refill    amiodarone 200 mg tablet Take 200 mg by mouth daily      amiodarone 200 mg tablet       apixaban (ELIQUIS) 5 mg Take 1 tablet by mouth 2 (two) times a day      atorvastatin (LIPITOR) 80 mg tablet Take 1 tablet by mouth daily      atorvastatin (LIPITOR) 80 mg tablet Take 80 mg by mouth daily      carvedilol (COREG) 12 5 mg tablet Take 12 5 mg by mouth 2 (two) times a day      clopidogrel (PLAVIX) 75 mg tablet Take 1 tablet by mouth daily      furosemide (LASIX) 40 mg tablet       lisinopril (ZESTRIL) 10 mg tablet       lisinopril (ZESTRIL) 10 mg tablet TAKE ONE TABLET BY MOUTH DAILY        nitroglycerin (NITROSTAT) 0 4 mg SL tablet Place 1 tablet under the tongue every 5 (five) minutes as needed Chest pain       No current facility-administered medications on file prior to visit  No Known Allergies    Current Outpatient Medications on File Prior to Visit   Medication Sig Dispense Refill    amiodarone 200 mg tablet Take 200 mg by mouth daily      amiodarone 200 mg tablet       apixaban (ELIQUIS) 5 mg Take 1 tablet by mouth 2 (two) times a day      atorvastatin (LIPITOR) 80 mg tablet Take 1 tablet by mouth daily      atorvastatin (LIPITOR) 80 mg tablet Take 80 mg by mouth daily      carvedilol (COREG) 12 5 mg tablet Take 12 5 mg by mouth 2 (two) times a day      clopidogrel (PLAVIX) 75 mg tablet Take 1 tablet by mouth daily      furosemide (LASIX) 40 mg tablet       lisinopril (ZESTRIL) 10 mg tablet       lisinopril (ZESTRIL) 10 mg tablet TAKE ONE TABLET BY MOUTH DAILY   nitroglycerin (NITROSTAT) 0 4 mg SL tablet Place 1 tablet under the tongue every 5 (five) minutes as needed Chest pain       No current facility-administered medications on file prior to visit  Objective   Vitals: Blood pressure 152/94, pulse 79, temperature 97 8 °F (36 6 °C), height 5' 8" (1 727 m), weight (!) 177 kg (390 lb)  ,Body mass index is 59 3 kg/m²  PE:  AAOx 3  WDWN  Hearing intact, no drainage from eyes  Regular rate  no audible wheezing  no abdominal distension  LE compartments soft, skin intact    rightknee:    Appearance:  no swelling   No ecchymosis  no obvious joint deformity   Mild effusion  Palpation/Tenderness:  +TTP over medial joint line  No TTP over lateral joint line   No TTP over patella  No TTP over patellar tendon  No TTP over pes anserine bursa  Active Range of Motion:  AROM: 0-90   PROM:   Special Tests:    Patellar grind:  Negative  Valgus Stress Test:  negative  Varus Stress Test:  negative   Pain with valgus and stress   No ipsilateral hip pain with ROM    Imaging Studies: I have personally reviewed pertinent films in PACS  rightknee: Moderate to severe   DJD, no acute osseous abnormality     Scribe Attestation    I,:  Inderjit Dent Solomon am acting as a scribe while in the presence of the attending physician :       I,:  Orquidea Ferraro, DO personally performed the services described in this documentation    as scribed in my presence :

## 2021-06-21 NOTE — PATIENT INSTRUCTIONS
Over the counter Voltaren gel   Tylenol 1000 mg every 8 hours as needed for pain   Do not exceed 3000 mg a day

## 2021-06-21 NOTE — LETTER
June 21, 2021     [place], [facility]    Patient: Geraldo Allen   YOB: 1965   Date of Visit: 6/21/2021       Dear Dr Nori Taylor: Thank you for referring Geraldo Allen to me for evaluation  Below are my notes for this consultation  If you have questions, please do not hesitate to call me  I look forward to following your patient along with you  Sincerely,        Jocelin Villatoro DO        CC: No Recipients  Jocelin Villatoro DO  6/21/2021  7:08 PM  Sign when Signing Visit   Assessment/Plan     1  Internal derangement of right knee    2  Right knee pain, unspecified chronicity    3  Primary osteoarthritis of right knee      Orders Placed This Encounter   Procedures    XR knee 4+ vw right injury    MRI knee right  wo contrast     · Patient has moderate-severe right knee osteoarthritis with possible ligament strain due to a fall 4 weeks ago  · Will be ordering MRI right knee to r/o soft tissue pathology   · Continue taking Tylenol as needed for pain   May take up to 3000 mg a day  Patient cannot take NSAIDs due to being on Plavix  · May use over the counter Voltaren gel  as needed for pain  ·  may consider a right knee steroid injection next office visit  Patient is aware the dry skin around his right knee needs to be improved before having a steroid injection  Return for Discuss MRI Right knee results   I answered all of the patient's questions during the visit and provided education of the patient's condition during the visit  The patient verbalized understanding of the information given and agrees with the plan  This note was dictated using [employer] software  It may contain errors including improperly dictated words  Please contact physician directly for any questions  History of Present Illness   Chief complaint:   Chief Complaint   Patient presents with    Right Knee - Pain       HPI: Geraldo Allen is a 64 y o  male that c/o right knee pain    He was referred by urgent care Dr Conchis Renae  He states he had a fall about 4  weeks ago  He states while working at home, he slipped on dog poo  and fell directly on his right knee  He states he started having increased burning pain in the anteromedial aspect of the right knee  Patient went to urgent care and had x-rays taken of the right knee which showed no acute fractures and was placed in a knee immobilizer  He states he is having constant burning and sharp pain over with the anteromedial aspect of the right knee  He denies instability or locking  Pain is worse with pivoting, lateral movements and with walking  He has been taking Tylenol as needed for pain with no relief  Patient cannot take NSAIDs due to being on Plavix  Patient also has been using ice and heat and states with no relief  He has no history having any injections, physical therapy or surgeries on the right knee  He has not been vaccinated for COVID  ROS:    See HPI for musculoskeletal review  All other systems reviewed are negative     Historical Information   Past Medical History:   Diagnosis Date    Myocardial infarct ([facility] [address] )      No past surgical history on file    Social History   Social History     Substance and Sexual Activity   Alcohol Use No     Social History     Substance and Sexual Activity   Drug Use No     Social History     Tobacco Use   Smoking Status Current Every Day Smoker    Last attempt to quit: 1/4/2018    Years since quitting: 3 4   Smokeless Tobacco Never Used     Family History:   Family History   Problem Relation Age of Onset    No Known Problems Father     Heart disease Mother        Current Outpatient Medications on File Prior to Visit   Medication Sig Dispense Refill    amiodarone 200 mg tablet Take 200 mg by mouth daily      amiodarone 200 mg tablet       apixaban (ELIQUIS) 5 mg Take 1 tablet by mouth 2 (two) times a day      atorvastatin (LIPITOR) 80 mg tablet Take 1 tablet by mouth daily      atorvastatin (LIPITOR) 80 mg tablet Take 80 mg by mouth daily      carvedilol (COREG) 12 5 mg tablet Take 12 5 mg by mouth 2 (two) times a day      clopidogrel (PLAVIX) 75 mg tablet Take 1 tablet by mouth daily      furosemide (LASIX) 40 mg tablet       lisinopril (ZESTRIL) 10 mg tablet       lisinopril (ZESTRIL) 10 mg tablet TAKE ONE TABLET BY MOUTH DAILY   nitroglycerin (NITROSTAT) 0 4 mg SL tablet Place 1 tablet under the tongue every 5 (five) minutes as needed Chest pain       No current facility-administered medications on file prior to visit  No Known Allergies    Current Outpatient Medications on File Prior to Visit   Medication Sig Dispense Refill    amiodarone 200 mg tablet Take 200 mg by mouth daily      amiodarone 200 mg tablet       apixaban (ELIQUIS) 5 mg Take 1 tablet by mouth 2 (two) times a day      atorvastatin (LIPITOR) 80 mg tablet Take 1 tablet by mouth daily      atorvastatin (LIPITOR) 80 mg tablet Take 80 mg by mouth daily      carvedilol (COREG) 12 5 mg tablet Take 12 5 mg by mouth 2 (two) times a day      clopidogrel (PLAVIX) 75 mg tablet Take 1 tablet by mouth daily      furosemide (LASIX) 40 mg tablet       lisinopril (ZESTRIL) 10 mg tablet       lisinopril (ZESTRIL) 10 mg tablet TAKE ONE TABLET BY MOUTH DAILY   nitroglycerin (NITROSTAT) 0 4 mg SL tablet Place 1 tablet under the tongue every 5 (five) minutes as needed Chest pain       No current facility-administered medications on file prior to visit  Objective   Vitals: Blood pressure 152/94, pulse 79, temperature 97 8 °F (36 6 °C), height 5' 8" (1 727 m), weight (!) 177 kg (390 lb)  ,Body mass index is 59 3 kg/m²      PE:  AAOx 3  WDWN  Hearing intact, no drainage from eyes  Regular rate  no audible wheezing  no abdominal distension  LE compartments soft, skin intact    rightknee:    Appearance:  no swelling   No ecchymosis  no obvious joint deformity   Mild effusion  Palpation/Tenderness:  +TTP over medial joint line  No TTP over lateral joint line   No TTP over patella  No TTP over patellar tendon  No TTP over pes anserine bursa  Active Range of Motion:  AROM: 0-90   PROM:   Special Tests:    Patellar grind:  Negative  Valgus Stress Test:  negative  Varus Stress Test:  negative   Pain with valgus and stress   No ipsilateral hip pain with ROM    Imaging Studies: I have personally reviewed pertinent films in PACS  rightknee: Moderate to severe   DJD, no acute osseous abnormality     Scribe Attestation    I,:  Ann Bernal am acting as a scribe while in the presence of the attending physician :       I,:  Nat Valencia DO personally performed the services described in this documentation    as scribed in my presence :

## 2021-06-23 DIAGNOSIS — M17.11 PRIMARY OSTEOARTHRITIS OF RIGHT KNEE: Primary | ICD-10-CM

## 2021-06-28 ENCOUNTER — HOSPITAL ENCOUNTER (OUTPATIENT)
Dept: MRI IMAGING | Facility: HOSPITAL | Age: 56
Discharge: HOME | End: 2021-06-28
Attending: ORTHOPAEDIC SURGERY
Payer: COMMERCIAL

## 2021-06-28 DIAGNOSIS — M17.11 PRIMARY OSTEOARTHRITIS OF RIGHT KNEE: ICD-10-CM

## 2021-06-28 DIAGNOSIS — M23.91 INTERNAL DERANGEMENT OF RIGHT KNEE: ICD-10-CM

## 2021-06-28 PROCEDURE — 73721 MRI JNT OF LWR EXTRE W/O DYE: CPT

## 2021-07-08 ENCOUNTER — TELEPHONE (OUTPATIENT)
Dept: OBGYN CLINIC | Facility: MEDICAL CENTER | Age: 56
End: 2021-07-08

## 2021-07-08 NOTE — TELEPHONE ENCOUNTER
Patient came in for an appointment was not able to be seen, pt requested if she can receive a phone call to go over the MRI results  Please assist and advise  Thank you

## 2021-07-09 NOTE — TELEPHONE ENCOUNTER
I spoke with Mr  Devendra Leighton today  We reviewed the findings of his MRI over the phone  We discussed treatment options moving forward  We both agree that a steroid injection would be the next best step  If he is feeling better he is also open to going to physical therapy as well  He will call on Monday to schedule appointment for next Thursday or Friday  If he has any trouble he knows to call back and ask to speak with me directly  I am happy to make time for him as I was unable to see him yesterday due to needing to leave to do emergent surgery  He verbalized understanding and agrees with the plan

## 2021-07-13 ENCOUNTER — TELEPHONE (OUTPATIENT)
Dept: OBGYN CLINIC | Facility: HOSPITAL | Age: 56
End: 2021-07-13

## 2021-07-13 NOTE — TELEPHONE ENCOUNTER
Patient's wife called and scheduled the patient for this Friday, 7/16  She would like to know if he can work after receiving the injection      Call back  # [phone]

## 2021-07-13 NOTE — TELEPHONE ENCOUNTER
Can you please let the patient/ patient's wife know that patients are able to work after the injection  It is possible to have a little soreness for a day or two after, but patient are able to do normal activity   Thanks

## 2021-07-13 NOTE — TELEPHONE ENCOUNTER
Called & LVM for patient to call back and schedule MRI review appt with Dr Ananda Moses  I encouraged him to call us back and make appt  I did also let him know in V/M that we would be willing to be accommodating to his schedule

## 2021-07-16 ENCOUNTER — OFFICE VISIT (OUTPATIENT)
Dept: OBGYN CLINIC | Facility: MEDICAL CENTER | Age: 56
End: 2021-07-16
Payer: COMMERCIAL

## 2021-07-16 VITALS
BODY MASS INDEX: 59.3 KG/M2 | WEIGHT: 315 LBS | HEART RATE: 103 BPM | SYSTOLIC BLOOD PRESSURE: 164 MMHG | DIASTOLIC BLOOD PRESSURE: 97 MMHG

## 2021-07-16 DIAGNOSIS — S83.411A SPRAIN OF MEDIAL COLLATERAL LIGAMENT OF RIGHT KNEE, INITIAL ENCOUNTER: ICD-10-CM

## 2021-07-16 DIAGNOSIS — M17.11 PRIMARY OSTEOARTHRITIS OF RIGHT KNEE: Primary | ICD-10-CM

## 2021-07-16 DIAGNOSIS — S83.241A OTHER TEAR OF MEDIAL MENISCUS, CURRENT INJURY, RIGHT KNEE, INITIAL ENCOUNTER: ICD-10-CM

## 2021-07-16 PROCEDURE — 20610 DRAIN/INJ JOINT/BURSA W/O US: CPT | Performed by: ORTHOPAEDIC SURGERY

## 2021-07-16 PROCEDURE — 99214 OFFICE O/P EST MOD 30 MIN: CPT | Performed by: ORTHOPAEDIC SURGERY

## 2021-07-16 RX ORDER — METHYLPREDNISOLONE ACETATE 40 MG/ML
2 INJECTION, SUSPENSION INTRA-ARTICULAR; INTRALESIONAL; INTRAMUSCULAR; SOFT TISSUE
Status: COMPLETED | OUTPATIENT
Start: 2021-07-16 | End: 2021-07-16

## 2021-07-16 RX ORDER — LIDOCAINE HYDROCHLORIDE 10 MG/ML
3 INJECTION, SOLUTION INFILTRATION; PERINEURAL
Status: COMPLETED | OUTPATIENT
Start: 2021-07-16 | End: 2021-07-16

## 2021-07-16 RX ADMIN — LIDOCAINE HYDROCHLORIDE 3 ML: 10 INJECTION, SOLUTION INFILTRATION; PERINEURAL at 09:20

## 2021-07-16 RX ADMIN — METHYLPREDNISOLONE ACETATE 2 ML: 40 INJECTION, SUSPENSION INTRA-ARTICULAR; INTRALESIONAL; INTRAMUSCULAR; SOFT TISSUE at 09:20

## 2021-07-16 NOTE — PROGRESS NOTES
Assessment/Plan:  1  Primary osteoarthritis of right knee    2  Sprain of medial collateral ligament of right knee, initial encounter    3  Other tear of medial meniscus, current injury, right knee, initial encounter      Orders Placed This Encounter   Procedures    Large joint arthrocentesis    Ambulatory referral to Physical Therapy       · Patient has right knee moderate to severe osteoarthritis, medial meniscus tear, and MCL sprain  · Imaging reviewed with patient  Non operative treatment options reviewed including bracing, injections, PT, and rest  Patient does have a significant amount of arthritis but he is not a total joint candidate at this time  · Patient received right knee steroid injection in the office today  Tolerated the procedure well  Advised to apply ice and avoid strenuous activity for 1-2 days as needed  · PT script provided  · Patient does have short hinge knee brace which I encouraged him to wear to rest his MCL  However brace is not ideal fit due to body habitus  · Continue tylenol for pain up to 3000 mg per day  No NSAIDs due to being on plavix  Return in about 6 weeks (around 8/27/2021) for right knee recheck  I answered all of the patient's questions during the visit and provided education of the patient's condition during the visit  The patient verbalized understanding of the information given and agrees with the plan  This note was dictated using [employer] software  It may contain errors including improperly dictated words  Please contact physician directly for any questions  Subjective   Chief Complaint:   Chief Complaint   Patient presents with    Right Knee - Follow-up       HPI  Alfonzo Johnson is a 64 y o  male who presents for follow up for right knee moderate to severe osteoarthritis and possible ligament strain from a fall seven weeks ago  Patient is here today to review right knee MRI   Patient states he is doing the same or slightly even worse since last office visit  He has pain over the anterior medial aspect of the right knee  He does feel his knee locks up with prolonged standing  Pain is worse with walking, stairs, and increased activities  He has been wearing a short hinged knee brace but causes discomfort and tends to slide down his leg  He is taking Tylenol as needed for pain with no relief  Patient is on Plavix and cannot take NSAIDs  He has not had his COVID vaccination but would like to discuss it with is cardiologist first      Review of Systems  ROS:    See HPI for musculoskeletal review  All other systems reviewed are negative     History:  Past Medical History:   Diagnosis Date    Myocardial infarct ([facility] [address] )      No past surgical history on file  Social History   Social History     Substance and Sexual Activity   Alcohol Use No     Social History     Substance and Sexual Activity   Drug Use No     Social History     Tobacco Use   Smoking Status Current Every Day Smoker    Last attempt to quit: 1/4/2018    Years since quitting: 3 5   Smokeless Tobacco Never Used     Family History:   Family History   Problem Relation Age of Onset    No Known Problems Father     Heart disease Mother        Current Outpatient Medications on File Prior to Visit   Medication Sig Dispense Refill    amiodarone 200 mg tablet Take 200 mg by mouth daily      amiodarone 200 mg tablet       apixaban (ELIQUIS) 5 mg Take 1 tablet by mouth 2 (two) times a day      atorvastatin (LIPITOR) 80 mg tablet Take 1 tablet by mouth daily      atorvastatin (LIPITOR) 80 mg tablet Take 80 mg by mouth daily      carvedilol (COREG) 12 5 mg tablet Take 12 5 mg by mouth 2 (two) times a day      clopidogrel (PLAVIX) 75 mg tablet Take 1 tablet by mouth daily      furosemide (LASIX) 40 mg tablet       lisinopril (ZESTRIL) 10 mg tablet       lisinopril (ZESTRIL) 10 mg tablet TAKE ONE TABLET BY MOUTH DAILY        nitroglycerin (NITROSTAT) 0 4 mg SL tablet Place 1 tablet under the tongue every 5 (five) minutes as needed Chest pain       No current facility-administered medications on file prior to visit  No Known Allergies     Objective     /97   Pulse 103   Wt (!) 177 kg (390 lb)   BMI 59 30 kg/m²      PE:  AAOx 3  WDWN  Hearing intact, no drainage from eyes  no audible wheezing  no abdominal distension  LE compartments soft, skin intact    Ortho Exam:  right Knee:   No erythema  no swelling  no effusion  no warmth  +TTP over medial joint line  AROM: 0- 125  Stable to varus/valgus stress      Imaging Studies: I have personally reviewed pertinent films in PACS  MRI  right knee: osteoarthritis, medial mensicus tear with extrusion, MCL sprain, joint effusion      Large joint arthrocentesis: R knee  Universal Protocol:  Consent: Verbal consent obtained    Risks and benefits: risks, benefits and alternatives were discussed  Consent given by: patient  Site marked: the operative site was marked  Supporting Documentation  Indications: pain   Procedure Details  Location: knee - R knee  Preparation: Patient was prepped and draped in the usual sterile fashion  Needle size: 22 G  Ultrasound guidance: no  Approach: anterolateral  Medications administered: 3 mL lidocaine 1 %; 2 mL methylPREDNISolone acetate 40 mg/mL    Patient tolerance: patient tolerated the procedure well with no immediate complications  Dressing:  Sterile dressing applied

## 2022-05-26 ENCOUNTER — VBI (OUTPATIENT)
Dept: ADMINISTRATIVE | Facility: OTHER | Age: 57
End: 2022-05-26

## 2022-09-16 ENCOUNTER — VBI (OUTPATIENT)
Dept: ADMINISTRATIVE | Facility: OTHER | Age: 57
End: 2022-09-16

## 2023-02-15 ENCOUNTER — OFFICE VISIT (OUTPATIENT)
Dept: FAMILY MEDICINE CLINIC | Facility: CLINIC | Age: 58
End: 2023-02-15

## 2023-02-15 ENCOUNTER — TRANSITIONAL CARE MANAGEMENT (OUTPATIENT)
Dept: FAMILY MEDICINE CLINIC | Facility: CLINIC | Age: 58
End: 2023-02-15

## 2023-02-15 DIAGNOSIS — I50.33 ACUTE ON CHRONIC DIASTOLIC HEART FAILURE (HCC): ICD-10-CM

## 2023-02-15 DIAGNOSIS — R74.01 ELEVATED TRANSAMINASE LEVEL: ICD-10-CM

## 2023-02-15 DIAGNOSIS — E66.01 SEVERE OBESITY (BMI >= 40) (HCC): ICD-10-CM

## 2023-02-15 DIAGNOSIS — F17.200 TOBACCO USE DISORDER: ICD-10-CM

## 2023-02-15 DIAGNOSIS — I10 HYPERTENSION, ESSENTIAL: ICD-10-CM

## 2023-02-15 DIAGNOSIS — G47.33 SEVERE OBSTRUCTIVE SLEEP APNEA: ICD-10-CM

## 2023-02-15 DIAGNOSIS — N17.9 AKI (ACUTE KIDNEY INJURY) (HCC): ICD-10-CM

## 2023-02-15 DIAGNOSIS — I25.10 CORONARY ARTERY DISEASE INVOLVING NATIVE CORONARY ARTERY OF NATIVE HEART WITHOUT ANGINA PECTORIS: ICD-10-CM

## 2023-02-15 DIAGNOSIS — J96.02 ACUTE RESPIRATORY FAILURE WITH HYPERCAPNIA (HCC): Primary | ICD-10-CM

## 2023-02-15 DIAGNOSIS — I48.91 ATRIAL FIBRILLATION WITH RVR (HCC): ICD-10-CM

## 2023-02-15 PROBLEM — I50.31 ACUTE DIASTOLIC ACC/AHA STAGE C CONGESTIVE HEART FAILURE (HCC): Status: ACTIVE | Noted: 2019-02-27

## 2023-02-15 PROBLEM — I21.3 STEMI (ST ELEVATION MYOCARDIAL INFARCTION) (HCC): Status: ACTIVE | Noted: 2018-01-04

## 2023-02-15 PROBLEM — J96.92 HYPERCAPNIC RESPIRATORY FAILURE (HCC): Status: ACTIVE | Noted: 2023-02-07

## 2023-02-15 RX ORDER — NICOTINE 21 MG/24HR
1 PATCH, TRANSDERMAL 24 HOURS TRANSDERMAL EVERY 24 HOURS
Qty: 28 PATCH | Refills: 0 | Status: SHIPPED | OUTPATIENT
Start: 2023-02-15

## 2023-02-15 RX ORDER — NEBULIZER AND COMPRESSOR
EACH MISCELLANEOUS 2 TIMES DAILY
Qty: 1 KIT | Refills: 0 | Status: SHIPPED | OUTPATIENT
Start: 2023-02-15

## 2023-02-15 RX ORDER — FAMOTIDINE 20 MG/1
20 TABLET, FILM COATED ORAL 2 TIMES DAILY
COMMUNITY
Start: 2023-02-15 | End: 2023-03-17

## 2023-02-15 RX ORDER — NEBULIZER AND COMPRESSOR
EACH MISCELLANEOUS DAILY
Qty: 1 KIT | Refills: 0 | Status: SHIPPED | OUTPATIENT
Start: 2023-02-15

## 2023-02-15 RX ORDER — TORSEMIDE 20 MG/1
TABLET ORAL
COMMUNITY
Start: 2023-02-14

## 2023-02-15 RX ORDER — CALCIUM CARBONATE-CHOLECALCIFEROL TAB 250 MG-125 UNIT 250-125 MG-UNIT
1 TAB ORAL DAILY
COMMUNITY
Start: 2023-02-15

## 2023-02-15 RX ORDER — NEBULIZER AND COMPRESSOR
EACH MISCELLANEOUS DAILY
Qty: 1 KIT | Refills: 0 | Status: SHIPPED | OUTPATIENT
Start: 2023-02-15 | End: 2023-02-15 | Stop reason: SDUPTHER

## 2023-02-15 NOTE — PROGRESS NOTES
Virtual TCM Visit:    Verification of patient location:    Patient is located in the following state in which I hold an active license [place]    Assessment/Plan:   Patient to continue present treatment  Instructed to follow a low fat, low salt and low carbohydrate diet  Patient to continue on oxygen 2 L/min nasal cannula  Has appointment at sleep center tonight  Patient to schedule follow-up appoint with [address] pulmonology and cardiology  Also recommend follow-up with wound healing center as needed  Patient to continue abstinence from tobacco and will wear nicotine patches  Patient to obtain home pulse oximeter and extra-large blood pressure cuff  Follow-up with specialist as scheduled and return to the office as needed  Problem List Items Addressed This Visit        Respiratory    Severe obstructive sleep apnea    Hypercapnic respiratory failure (HCC) - Primary       Cardiovascular and Mediastinum    Hypertension, essential    Relevant Medications    torsemide (DEMADEX) 20 mg tablet    Atrial fibrillation with RVR ([facility])    Coronary artery disease involving native coronary artery of native heart without angina pectoris    Acute on chronic diastolic heart failure ([facility])       Genitourinary    QUINCY (acute kidney injury) ([facility] [address] )    Relevant Medications    torsemide (DEMADEX) 20 mg tablet       Other    Severe obesity (BMI >= 40) ([facility])    Tobacco use disorder    Elevated transaminase level        Reason for visit is TCM for recent hospitalization at [place] [address] from 2/7/2023 until 2/14/2023 for acute respiratory failure with hypercapnia patient was initially admitted to the ICU  Also with CAD, A-fib, acute on chronic congestive heart failure, severe obesity and severe obstructive sleep apnea, hypertension, QUINCY which resolved and elevated LFTs  Medications were adjusted and patient was discharged home on oxygen 2 L/min nasal cannula    Patient states he lost 13 pounds of fluid while in the hospital     Encounter provider Jessie Oglesby DO     Provider located at [address] [facility] [address] [id number]    Recent Visits  No visits were found meeting these conditions  Showing recent visits within past 7 days and meeting all other requirements  Today's Visits  Date Type Provider Dept   02/15/23 Office Visit Jessie Oglesby DO [facility]   Showing today's visits and meeting all other requirements  Future Appointments  No visits were found meeting these conditions  Showing future appointments within next 150 days and meeting all other requirements       After connecting through "[employer] the patient was identified by name and date of birth  Jeff Vasquez was informed that this is a telemedicine visit and that the visit is being conducted through the [address] [employer] platform  He agrees to proceed     My office door was closed  No one else was in the room  He acknowledged consent and understanding of privacy and security of the video platform  The patient has agreed to participate and understands they can discontinue the visit at any time  Patient is aware this is a billable service  Transitional Care Management Review: Jeff Vasquez is a 62 y o  male here for TCM follow up  During the TCM phone call patient stated:    TCM Call     Date and time call was made  2/15/2023 11:06 AM    Hospital care reviewed  Records reviewed    Patient was hospitial[facility] at  [facility]    Date of Admission  02/07/23    Date of discharge  02/14/23    Diagnosis  Acute respiratory failure with hypercapnia    Disposition  Home    Were the patients medications reviewed and updated  No    Current Symptoms  Loose Stools    Loose stool severity  Severe      TCM Call     Post hospital issues  Reduced activity    Should patient be enrolled in anticoag monitoring? No    Scheduled for follow up?   Yes    Patients specialists  Pulmonlolgist; Other (comment)    Pulmonologist name  not scheduled yet    Other specialists names  sleep study 2/15/23    Referrals needed  no    Did you obtain your prescribed medications  Yes    Do you need help managing your prescriptions or medications  No    Is transportation to your appointment needed  No    I have advised the patient to call PCP with any new or worsening symptoms  Xi Weller MA    Living Arrangements  Spouse or Significiant other    Support System  Family; Friends    The type of support provided  Emotional; Physical    Do you have social support  Yes, as much as I need    Are you recieving any outpatient services  No    Are you recieving home care services  No    Are you using any community resources  No    Current waiver services  No    Have you fallen in the last 12 months  No    Interperter language line needed  No    Counseling  Caregiver; Family        Subjective:     Patient ID: Jonathan Rodriguez is a 62 y o  male  Patient is feeling significantly better overall  He denies chest pain and admits to significant decrease shortness of breath and dyspnea on exertion  He denies orthopnea and significantly decreased leg swelling  Patient discontinued smoking completely and requests continued nicotine patches  HPI  Review of Systems      Objective: There were no vitals filed for this visit  Physical Exam    Medications have been reviewed by provider in current encounter    I spent 25 minutes with the patient during this visit  Dimas Pulling, DO      VIRTUAL VISIT DISCLAIMER    Jonathan Rodriguez verbally agrees to participate in [facility]  Pt is aware that [facility] could be limited without vital signs or the ability to perform a full hands-on physical exam  Gopal Oro understands he or the provider may request at any time to terminate the video visit and request the patient to seek care or treatment in person

## 2023-02-17 ENCOUNTER — TELEPHONE (OUTPATIENT)
Dept: FAMILY MEDICINE CLINIC | Facility: CLINIC | Age: 58
End: 2023-02-17

## 2023-02-17 NOTE — TELEPHONE ENCOUNTER
Please advise  Patient's wife called and asked if you could put a referral in for a neuronist and weight management, she said she needs help no what to follow to feed her

## 2023-02-20 DIAGNOSIS — E66.01 SEVERE OBESITY (BMI >= 40) (HCC): Primary | ICD-10-CM

## 2023-02-20 DIAGNOSIS — Z71.89 COMPLEX CARE COORDINATION: Primary | ICD-10-CM

## 2023-02-21 ENCOUNTER — PATIENT OUTREACH (OUTPATIENT)
Dept: FAMILY MEDICINE CLINIC | Facility: CLINIC | Age: 58
End: 2023-02-21

## 2023-02-21 DIAGNOSIS — I50.33 ACUTE ON CHRONIC DIASTOLIC HEART FAILURE (HCC): ICD-10-CM

## 2023-02-21 DIAGNOSIS — J96.02 ACUTE RESPIRATORY FAILURE WITH HYPERCAPNIA (HCC): Primary | ICD-10-CM

## 2023-02-21 DIAGNOSIS — I50.31: ICD-10-CM

## 2023-02-21 DIAGNOSIS — G47.33 SEVERE OBSTRUCTIVE SLEEP APNEA: ICD-10-CM

## 2023-02-21 NOTE — TELEPHONE ENCOUNTER
Patient's wife was wondering if they can get a script for a potable oxygen tank, she said she spoke to their insurance and they will cover it and they think it will benefit him by helping him get up, move around and get exercise  Airway patent, Nasal mucosa clear. Mouth with normal mucosa. Throat has no vesicles, no oropharyngeal exudates and uvula is midline.

## 2023-02-28 ENCOUNTER — PATIENT OUTREACH (OUTPATIENT)
Dept: FAMILY MEDICINE CLINIC | Facility: CLINIC | Age: 58
End: 2023-02-28

## 2023-03-13 DIAGNOSIS — K21.9 GASTROESOPHAGEAL REFLUX DISEASE, UNSPECIFIED WHETHER ESOPHAGITIS PRESENT: Primary | ICD-10-CM

## 2023-03-13 RX ORDER — FAMOTIDINE 20 MG/1
20 TABLET, FILM COATED ORAL 2 TIMES DAILY
Qty: 60 TABLET | Refills: 5 | Status: SHIPPED | OUTPATIENT
Start: 2023-03-13 | End: 2023-09-09

## 2023-03-13 NOTE — TELEPHONE ENCOUNTER
Received a fax from [facility] asking to have his Famotidine refilled      Pharmacy: [facility] in [place]

## 2023-03-21 ENCOUNTER — OFFICE VISIT (OUTPATIENT)
Dept: FAMILY MEDICINE CLINIC | Facility: CLINIC | Age: 58
End: 2023-03-21

## 2023-03-21 VITALS
HEIGHT: 69 IN | OXYGEN SATURATION: 95 % | RESPIRATION RATE: 16 BRPM | HEART RATE: 88 BPM | TEMPERATURE: 97.2 F | SYSTOLIC BLOOD PRESSURE: 124 MMHG | WEIGHT: 315 LBS | DIASTOLIC BLOOD PRESSURE: 80 MMHG | BODY MASS INDEX: 46.65 KG/M2

## 2023-03-21 DIAGNOSIS — M62.838 TRAPEZIUS MUSCLE SPASM: ICD-10-CM

## 2023-03-21 DIAGNOSIS — I25.10 CORONARY ARTERY DISEASE INVOLVING NATIVE CORONARY ARTERY OF NATIVE HEART WITHOUT ANGINA PECTORIS: ICD-10-CM

## 2023-03-21 DIAGNOSIS — M54.2 ACUTE NECK PAIN: Primary | ICD-10-CM

## 2023-03-21 RX ORDER — NITROGLYCERIN 0.4 MG/1
0.4 TABLET SUBLINGUAL
Qty: 25 TABLET | Refills: 2 | Status: SHIPPED | OUTPATIENT
Start: 2023-03-21

## 2023-03-21 RX ORDER — CYCLOBENZAPRINE HCL 10 MG
10 TABLET ORAL 3 TIMES DAILY PRN
Qty: 30 TABLET | Refills: 0 | Status: SHIPPED | OUTPATIENT
Start: 2023-03-21

## 2023-03-21 NOTE — PROGRESS NOTES
Name: Aisha Plunkett      : 1965      MRN: [id number]  Encounter Provider: Jacob Taylor DO  Encounter Date: 3/21/2023   Encounter department: [address] [place] [address]   Patient be started on Flexeril 10 mg 1 3 times daily as needed, caution regarding drowsiness  Patient may continue Tylenol as needed and recommend alternating ice and heat for 20 minutes each as needed  Patient knows to avoid NSAIDs  Patient given information sheet on neck stretching exercises  Discussed referral for physical therapy  Return the office in 1 week or call sooner as needed  1  Acute neck pain  -     cyclobenzaprine (FLEXERIL) 10 mg tablet; Take 1 tablet (10 mg total) by mouth 3 (three) times a day as needed for muscle spasms    2  Trapezius muscle spasm  -     cyclobenzaprine (FLEXERIL) 10 mg tablet; Take 1 tablet (10 mg total) by mouth 3 (three) times a day as needed for muscle spasms    3  Coronary artery disease involving native coronary artery of native heart without angina pectoris  -     nitroglycerin (NITROSTAT) 0 4 mg SL tablet; Place 1 tablet (0 4 mg total) under the tongue every 5 (five) minutes as needed for chest pain Chest pain           Subjective      Patient is here with his wife complaining of right-sided neck pain for the past 4 days  Patient denies any specific injury or fall  Complains of pain on the right side of his neck radiating into his posterior shoulder  He denies pain, numbness, tingling or weakness down his arms  He has treated this with Tylenol and heat with temporary relief  Patient is currently out of work  Neck Pain   This is a new problem  The current episode started in the past 7 days  The problem occurs constantly  The problem has been unchanged  The pain is associated with nothing  The pain is present in the right side  The quality of the pain is described as stabbing and shooting  The symptoms are aggravated by bending, twisting and position   The pain is same all the time  Stiffness is present all day and at night  Pertinent negatives include no chest pain, headaches, numbness, tingling or weakness  He has tried acetaminophen and heat for the symptoms  The treatment provided mild relief  Review of Systems   Cardiovascular: Negative for chest pain  Musculoskeletal: Positive for neck pain  Neurological: Negative for tingling, weakness, numbness and headaches  Current Outpatient Medications on File Prior to Visit   Medication Sig   • apixaban (ELIQUIS) 5 mg Take 1 tablet by mouth 2 (two) times a day   • atorvastatin (LIPITOR) 80 mg tablet Take 1 tablet by mouth daily   • Blood Pressure Monitoring (Adult Blood Pressure Cuff Lg) KIT Use 2 (two) times a day   • Blood Pressure Monitoring (Adult Blood Pressure Cuff Lg) KIT Use daily   • calcium carbonate-vitamin D 250 mg-3 125 mcg tablet Take 1 tablet by mouth daily   • carvedilol (COREG) 12 5 mg tablet Take 12 5 mg by mouth 2 (two) times a day   • famotidine (PEPCID) 20 mg tablet Take 1 tablet (20 mg total) by mouth 2 (two) times a day   • lisinopril (ZESTRIL) 10 mg tablet TAKE ONE TABLET BY MOUTH DAILY     • torsemide (DEMADEX) 20 mg tablet    • Ventolin  (90 Base) MCG/ACT inhaler    • [DISCONTINUED] nitroglycerin (NITROSTAT) 0 4 mg SL tablet Place 1 tablet under the tongue every 5 (five) minutes as needed Chest pain   • [DISCONTINUED] nicotine (NICODERM CQ) 14 mg/24hr TD 24 hr patch Place 1 patch on the skin over 24 hours every 24 hours (Patient not taking: Reported on 3/21/2023)   • [DISCONTINUED] nicotine (NICODERM CQ) 21 mg/24 hr TD 24 hr patch Place 1 patch on the skin over 24 hours every 24 hours (Patient not taking: Reported on 3/21/2023)   • [DISCONTINUED] nicotine (NICODERM CQ) 7 mg/24hr TD 24 hr patch Place 1 patch on the skin over 24 hours every 24 hours (Patient not taking: Reported on 3/21/2023)       Objective     /80 (BP Location: Left arm, Patient Position: Sitting, Cuff Size: Standard)   Pulse 88   Temp (!) 97 2 °F (36 2 °C) (Temporal)   Resp 16   Ht 5' 9" (1 753 m)   Wt (!) 188 kg (415 lb)   SpO2 95%   BMI 61 28 kg/m²     Physical Exam  Constitutional:       General: He is not in acute distress  Appearance: Normal appearance  He is obese  HENT:      Head: Normocephalic  Mouth/Throat:      Mouth: Mucous membranes are moist    Eyes:      General: No scleral icterus  Conjunctiva/sclera: Conjunctivae normal    Neck:      Comments: Significant decreased range of motion and right cervical paravertebral and right trapezius muscle tenderness  Upper extremity strength intact  Cardiovascular:      Rate and Rhythm: Normal rate and regular rhythm  Pulmonary:      Effort: Pulmonary effort is normal       Breath sounds: Normal breath sounds  Abdominal:      Palpations: Abdomen is soft  Tenderness: There is no abdominal tenderness  Musculoskeletal:      Cervical back: Neck supple  Tenderness present  Right lower leg: No edema  Left lower leg: No edema  Lymphadenopathy:      Cervical: No cervical adenopathy  Skin:     General: Skin is warm and dry  Neurological:      General: No focal deficit present  Mental Status: He is alert and oriented to person, place, and time  Psychiatric:         Mood and Affect: Mood normal          Behavior: Behavior normal          Thought Content:  Thought content normal          Judgment: Judgment normal        Audrey Neither, DO

## 2023-03-28 ENCOUNTER — VBI (OUTPATIENT)
Dept: ADMINISTRATIVE | Facility: OTHER | Age: 58
End: 2023-03-28

## 2023-04-24 ENCOUNTER — TELEPHONE (OUTPATIENT)
Dept: BARIATRICS | Facility: CLINIC | Age: 58
End: 2023-04-24

## 2023-04-24 NOTE — TELEPHONE ENCOUNTER
Pt called and was given his lab results   Patient would be willing to try injectable medication for weight loss

## 2023-04-27 DIAGNOSIS — E66.01 MORBID OBESITY WITH BMI OF 60.0-69.9, ADULT (HCC): Primary | ICD-10-CM

## 2023-04-27 NOTE — TELEPHONE ENCOUNTER
Spoke to patient  Denies personal hx pancreatitis or family hx MEN2/MTC  Will start Wegovy  SE profile reviewed  Patient will watch instructional video on [employer] for how to administer medication

## 2023-04-27 NOTE — TELEPHONE ENCOUNTER
Pt returned the call that his insurance would not give him any info regarding the 2 scripts until the scripts are sent to the pharmacy first before any info will be given to the patient  R Sharmin [address]     Pt would like a call back once the script is sent    Thank You

## 2023-05-02 NOTE — TELEPHONE ENCOUNTER
Patient's wife called for an update on the prior-authorization, checked covermymeds and advised her it was approved  She verbalized understanding and had no more questions at this time

## 2023-06-06 ENCOUNTER — VBI (OUTPATIENT)
Dept: ADMINISTRATIVE | Facility: OTHER | Age: 58
End: 2023-06-06

## 2023-07-13 ENCOUNTER — OFFICE VISIT (OUTPATIENT)
Dept: FAMILY MEDICINE CLINIC | Facility: CLINIC | Age: 58
End: 2023-07-13
Payer: COMMERCIAL

## 2023-07-13 VITALS
TEMPERATURE: 97.4 F | HEART RATE: 89 BPM | DIASTOLIC BLOOD PRESSURE: 80 MMHG | HEIGHT: 68 IN | SYSTOLIC BLOOD PRESSURE: 133 MMHG | BODY MASS INDEX: 47.74 KG/M2 | OXYGEN SATURATION: 94 % | WEIGHT: 315 LBS

## 2023-07-13 DIAGNOSIS — E66.01 MORBID OBESITY WITH BMI OF 60.0-69.9, ADULT (HCC): Primary | ICD-10-CM

## 2023-07-13 DIAGNOSIS — R11.0 NAUSEA: ICD-10-CM

## 2023-07-13 PROCEDURE — 99214 OFFICE O/P EST MOD 30 MIN: CPT | Performed by: FAMILY MEDICINE

## 2023-07-13 RX ORDER — ONDANSETRON HYDROCHLORIDE 8 MG/1
8 TABLET, FILM COATED ORAL EVERY 8 HOURS PRN
Qty: 20 TABLET | Refills: 0 | Status: SHIPPED | OUTPATIENT
Start: 2023-07-13

## 2023-07-13 NOTE — PROGRESS NOTES
Assessment/Plan: We do lengthy discussion regarding treatment options and I do recommend he consider starting Wegovy considering his morbid obesity and cardiac history. Side effect profile of medication reviewed with patient at length. Recommend consideration for having Zofran on hand if he is nauseous. He will follow-up with us or weight management for continued treatment. 1. Morbid obesity with BMI of 60.0-69.9, adult (HCC)  -     ondansetron (ZOFRAN) 8 mg tablet; Take 1 tablet (8 mg total) by mouth every 8 (eight) hours as needed for nausea or vomiting    2. Nausea  -     ondansetron (ZOFRAN) 8 mg tablet; Take 1 tablet (8 mg total) by mouth every 8 (eight) hours as needed for nausea or vomiting          Subjective:      Patient ID: Estefany Engle is a 62 y.o. male. Is here with wife. Here to discuss treatment for morbid obesity. He has seen weight management and they prescribe Wegovy. He is wanting to talk about the potential safety aspect versus benefit of this medication. He has a longstanding history of obesity. The following portions of the patient's history were reviewed and updated as appropriate: allergies, current medications, past family history, past medical history, past social history, past surgical history, and problem list.    Review of Systems   Constitutional: Negative. HENT: Negative. Eyes: Negative. Respiratory: Negative. Cardiovascular: Negative. Gastrointestinal: Negative. Endocrine: Negative. Genitourinary: Negative. Musculoskeletal: Negative. Skin: Negative. Allergic/Immunologic: Negative. Neurological: Negative. Hematological: Negative. Psychiatric/Behavioral: Negative.           Objective:      /80 (BP Location: Left arm, Patient Position: Sitting, Cuff Size: Standard)   Pulse 89   Temp (!) 97.4 °F (36.3 °C) (Tympanic)   Ht 5' 8" (1.727 m)   Wt (!) 197 kg (434 lb)   SpO2 94%   BMI 65.99 kg/m²          Physical Exam  Vitals reviewed. Constitutional:       Appearance: He is well-developed. HENT:      Head: Normocephalic and atraumatic. Right Ear: External ear normal. Tympanic membrane is not erythematous or bulging. Left Ear: External ear normal. Tympanic membrane is not erythematous or bulging. Nose: Nose normal.      Mouth/Throat:      Mouth: No oral lesions. Pharynx: No oropharyngeal exudate. Eyes:      General: No scleral icterus. Right eye: No discharge. Left eye: No discharge. Conjunctiva/sclera: Conjunctivae normal.   Neck:      Thyroid: No thyromegaly. Cardiovascular:      Rate and Rhythm: Normal rate and regular rhythm. Heart sounds: Normal heart sounds. No murmur heard. No friction rub. No gallop. Pulmonary:      Effort: Pulmonary effort is normal. No respiratory distress. Breath sounds: No wheezing or rales. Chest:      Chest wall: No tenderness. Abdominal:      General: Bowel sounds are normal. There is no distension. Palpations: Abdomen is soft. There is no mass. Tenderness: There is no abdominal tenderness. There is no guarding or rebound. Musculoskeletal:         General: No tenderness or deformity. Normal range of motion. Cervical back: Normal range of motion and neck supple. Lymphadenopathy:      Cervical: No cervical adenopathy. Skin:     General: Skin is warm and dry. Coloration: Skin is not pale. Findings: No erythema or rash. Neurological:      Mental Status: He is alert and oriented to person, place, and time. Cranial Nerves: No cranial nerve deficit. Motor: No abnormal muscle tone. Coordination: Coordination normal.      Deep Tendon Reflexes: Reflexes are normal and symmetric.    Psychiatric:         Behavior: Behavior normal.

## 2023-08-04 ENCOUNTER — TELEPHONE (OUTPATIENT)
Dept: FAMILY MEDICINE CLINIC | Facility: CLINIC | Age: 58
End: 2023-08-04

## 2023-08-04 DIAGNOSIS — E66.01 MORBID OBESITY WITH BMI OF 60.0-69.9, ADULT (HCC): Primary | ICD-10-CM

## 2023-08-04 NOTE — TELEPHONE ENCOUNTER
Pt called stating that he is on wegovy 0.25 and as per conversation at  the last office visit with Dr Lele [place], when he needed the next step he was to call to have it sent to pharmacy. Pt needs the next step og wegovy sent to [facility] in [address] [facility] before thurs 8/10 that is when he is due to take the shot again. There is not order for wegovy in his chart please send step 2 to pharmacy. Pt is aware provider is out of the office and will not return until 8/07 and will not see message until then.

## 2023-08-13 ENCOUNTER — APPOINTMENT (OUTPATIENT)
Dept: RADIOLOGY | Facility: CLINIC | Age: 58
End: 2023-08-13
Payer: COMMERCIAL

## 2023-08-13 ENCOUNTER — OFFICE VISIT (OUTPATIENT)
Dept: URGENT CARE | Facility: CLINIC | Age: 58
End: 2023-08-13
Payer: COMMERCIAL

## 2023-08-13 VITALS
DIASTOLIC BLOOD PRESSURE: 98 MMHG | SYSTOLIC BLOOD PRESSURE: 148 MMHG | WEIGHT: 315 LBS | TEMPERATURE: 99.5 F | BODY MASS INDEX: 47.74 KG/M2 | RESPIRATION RATE: 22 BRPM | HEIGHT: 68 IN | OXYGEN SATURATION: 96 % | HEART RATE: 85 BPM

## 2023-08-13 DIAGNOSIS — S53.401A ELBOW SPRAIN, RIGHT, INITIAL ENCOUNTER: Primary | ICD-10-CM

## 2023-08-13 DIAGNOSIS — M25.521 RIGHT ELBOW PAIN: ICD-10-CM
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
## 2023-08-13 PROCEDURE — 73080 X-RAY EXAM OF ELBOW: CPT

## 2023-08-13 PROCEDURE — S9083 URGENT CARE CENTER GLOBAL: HCPCS | Performed by: NURSE PRACTITIONER

## 2023-08-13 PROCEDURE — G0382 LEV 3 HOSP TYPE B ED VISIT: HCPCS | Performed by: NURSE PRACTITIONER

## 2023-08-13 NOTE — LETTER
[address] [place] [address]  [address] [facility] [address]      August 16, 2023    MRN: [id number]     Phone: [phone]     Dear Mr. Naveed Maza recently had a(n) Diagnostic Imaging performed on 8/13/2023 at  [address] [place] [address] that was requested by [facility] [address] [place] [address]. The study was reviewed by a radiologist, which is a physician who specializes in medical imaging. The radiologist issued a report describing his or her findings. In that report there was a finding that the radiologist felt warranted further discussion with your health care provider and that discussion would be beneficial to you. The results were sent to Griffin Estrada on 08/14/2023  8:16 AM. We recommend that you contact Griffin Estrada at [phone] or set up an appointment to discuss the results of the imaging test. If you have already heard from [facility] [address] [place] [address] regarding the results of your study, you can disregard this letter. This letter is not meant to alarm you, but intended to encourage you to follow-up on your results with the provider that sent you for the imaging study. In addition, we have enclosed answers to frequently asked questions by other patients who have also received a letter to review results with their health care provider (see page two). Thank you for choosing [address] [place] [address] for your medical imaging needs. FREQUENTLY ASKED QUESTIONS    Why am I receiving this letter? [facility] requires us to notify patients who have findings on imaging exams that may require more testing or follow-up with a health professional within the next 3 months.         How serious is the finding on the imaging test?  This letter is sent to all patients who may need follow-up or more testing within the next 3 months. Receiving this letter does not necessarily mean you have a life-threatening imaging finding or disease. Recommendations in the radiologist’s imaging report are general in nature and it is up to your healthcare provider to say whether those recommendations make sense for your situation. You are strongly encouraged to talk to your health care provider about the results and ask whether additional steps need to be taken. Where can I get a copy of the final report for my recent radiology exam?  To get a full copy of the report you can access your records online at [email or web address] or please contact [facility] Medical Records Department at [phone] Monday through Friday between 8 am and 6 pm.         What do I need to do now? Please contact your health care provider who requested the imaging study to discuss what further actions (if any) are needed. You may have already heard from (your ordering provider) in regard to this test in which case you can disregard this letter. NOTICE IN ACCORDANCE WITH THE [place] STATE “PATIENT TEST RESULT INFORMATION ACT OF 2018”    You are receiving this notice as a result of a determination by your diagnostic imaging service that further discussions of your test results are warranted and would be beneficial to you. The complete results of your test or tests have been or will be sent to the health care practitioner that ordered the test or tests. It is recommended that you contact your health care practitioner to discuss your results as soon as possible.

## 2023-08-13 NOTE — PROGRESS NOTES
[facility]        NAME: Angelica Devine is a 62 y.o. male  : 1965    MRN: [id number]  DATE: 2023  TIME: 4:14 PM    Assessment and Plan   Elbow sprain, right, initial encounter [S53.401A]  1. Elbow sprain, right, initial encounter  Ambulatory Referral to Orthopedic Surgery      2. Right elbow pain  XR elbow 3+ vw right    Ambulatory Referral to Orthopedic Surgery      3. Fall down steps, initial encounter              Patient Instructions       Follow up with PCP in 3-5 days. Proceed to  ER if symptoms worsen. Your xray was preliminarily read by your provider. A radiologist will read the xray and you will be notified if it is abnormal.    You are to Rest, wear the sling when you are up moving around. Do not wear the sling to bed  You have been given an orthopedic referral and appt  Follow up with ortho as you may need further testing  Follow up with your PCP as well  Go to the ED if symptoms worsen  Take tylenol or motrin as needed for pain         Chief Complaint     Chief Complaint   Patient presents with   • Elbow Pain     Patient reports falling down steps 2 days ago injuring right elbow. History of Present Illness       This is a 62year old male who states slipped down wet steps Friday night and landed on his outstretched right hand and felt something pop. He states that he thought it would get better but still has pain. He states he took tylenol for pain. He states it hurts to fully extend but can flex the elbow. He states has pain with rotation of forearm. Review of Systems   Review of Systems   Constitutional: Negative. HENT: Negative. Eyes: Negative. Respiratory: Negative. Cardiovascular: Negative. Gastrointestinal: Negative. Endocrine: Negative. Genitourinary: Negative. Musculoskeletal:        Right elbow pain    Skin: Negative. Allergic/Immunologic: Negative. Neurological: Negative. Hematological: Negative. Psychiatric/Behavioral: Negative. Current Medications       Current Outpatient Medications:   •  apixaban (ELIQUIS) 5 mg, Take 1 tablet by mouth 2 (two) times a day, Disp: , Rfl:   •  atorvastatin (LIPITOR) 80 mg tablet, Take 1 tablet by mouth daily, Disp: , Rfl:   •  Blood Pressure Monitoring (Adult Blood Pressure Cuff Lg) KIT, Use 2 (two) times a day, Disp: 1 kit, Rfl: 0  •  Blood Pressure Monitoring (Adult Blood Pressure Cuff Lg) KIT, Use daily, Disp: 1 kit, Rfl: 0  •  calcium carbonate-vitamin D 250 mg-3.125 mcg tablet, Take 1 tablet by mouth daily, Disp: , Rfl:   •  carvedilol (COREG) 12.5 mg tablet, Take 25 mg by mouth 2 (two) times a day, Disp: , Rfl:   •  famotidine (PEPCID) 20 mg tablet, Take 1 tablet (20 mg total) by mouth 2 (two) times a day, Disp: 60 tablet, Rfl: 5  •  lisinopril (ZESTRIL) 10 mg tablet, TAKE ONE TABLET BY MOUTH DAILY. , Disp: , Rfl:   •  ondansetron (ZOFRAN) 8 mg tablet, Take 1 tablet (8 mg total) by mouth every 8 (eight) hours as needed for nausea or vomiting, Disp: 20 tablet, Rfl: 0  •  Semaglutide-Weight Management (WEGOVY) 0.5 MG/0.5ML, Inject 0.5 mL (0.5 mg total) under the skin once a week, Disp: 2 mL, Rfl: 0  •  torsemide (DEMADEX) 20 mg tablet, , Disp: , Rfl:   •  Ventolin  (90 Base) MCG/ACT inhaler, , Disp: , Rfl:   •  cyclobenzaprine (FLEXERIL) 10 mg tablet, Take 1 tablet (10 mg total) by mouth 3 (three) times a day as needed for muscle spasms (Patient not taking: Reported on 8/13/2023), Disp: 30 tablet, Rfl: 0  •  nitroglycerin (NITROSTAT) 0.4 mg SL tablet, Place 1 tablet (0.4 mg total) under the tongue every 5 (five) minutes as needed for chest pain Chest pain (Patient not taking: Reported on 8/13/2023), Disp: 25 tablet, Rfl: 2    Current Allergies     Allergies as of 08/13/2023   • (No Known Allergies)            The following portions of the patient's history were reviewed and updated as appropriate: allergies, current medications, past family history, past medical history, past social history, past surgical history and problem list.     Past Medical History:   Diagnosis Date   • Myocardial infarct [facility])        History reviewed. No pertinent surgical history. Family History   Problem Relation Age of Onset   • Heart disease Mother    • No Known Problems Father    • Cancer Neg Hx    • Diabetes Neg Hx    • Stroke Neg Hx          Medications have been verified. Objective   /98   Pulse 85   Temp 99.5 °F (37.5 °C) (Temporal)   Resp 22   Ht 5' 8" (1.727 m)   Wt (!) 196 kg (432 lb 9.6 oz)   SpO2 96%   BMI 65.78 kg/m²   No LMP for male patient. Physical Exam     Physical Exam  Vitals and nursing note reviewed. Constitutional:       General: He is not in acute distress. Appearance: Normal appearance. He is obese. He is not ill-appearing, toxic-appearing or diaphoretic. HENT:      Head: Normocephalic and atraumatic. Eyes:      Extraocular Movements: Extraocular movements intact. Cardiovascular:      Rate and Rhythm: Normal rate. Pulses: Normal pulses. Pulmonary:      Effort: Pulmonary effort is normal.   Musculoskeletal:         General: Tenderness and signs of injury present. No swelling or deformity. Cervical back: Normal range of motion. Comments: TTP at proximal radius/ulna head. No bruising, swelling. Muscle strength 5/5. Able to fully extend but pain with flexing. Pain with rotation of forearm  Sensation intact.  + radial pulse    Skin:     General: Skin is warm and dry. Capillary Refill: Capillary refill takes less than 2 seconds. Neurological:      General: No focal deficit present. Mental Status: He is alert and oriented to person, place, and time. Psychiatric:         Mood and Affect: Mood normal.         Behavior: Behavior normal.         Thought Content:  Thought content normal.         Judgment: Judgment normal.         Preliminary reading right elbow xray  No acute osseous abnormality seen  Waiting on rad read      Orthopedic injury treatment    Date/Time: 8/13/2023 4:07 PM    Performed by: Firman Goodpasture, CRNP  Authorized by: Firman Goodpasture, CRNP    Patient Location:  [facility] Protocol:  Procedure performed by: Nkechi Trejo RN )  Consent: The procedure was performed in an emergent situation. Verbal consent obtained. Written consent obtained. Risks and benefits: risks, benefits and alternatives were discussed  Consent given by: patient  Time out: Immediately prior to procedure a "time out" was called to verify the correct patient, procedure, equipment, support staff and site/side marked as required. Timeout called at: 8/13/2023 4:07 PM.  Patient understanding: patient states understanding of the procedure being performed  Patient consent: the patient's understanding of the procedure matches consent given  Procedure consent: procedure consent matches procedure scheduled  Relevant documents: relevant documents present and verified  Test results: test results available and properly labeled  Radiology Images displayed and confirmed.  If images not available, report reviewed: imaging studies available  Required items: required blood products, implants, devices, and special equipment available  Patient identity confirmed: verbally with patient and hospital-assigned identification number      Neurovascular status: Neurovascularly intact    Distal perfusion: normal    Neurological function: normal    Range of motion: reduced    Local anesthesia used?: No    General anesthesia used?: No    Immobilization:  Sling  Neurovascular status: Neurovascularly intact    Distal perfusion: normal    Neurological function: normal    Range of motion: unchanged    Patient tolerance:  Patient tolerated the procedure well with no immediate complications

## 2023-08-13 NOTE — PATIENT INSTRUCTIONS
Your xray was preliminarily read by your provider. A radiologist will read the xray and you will be notified if it is abnormal.    You are to Rest, wear the sling when you are up moving around.    Do not wear the sling to bed  You have been given an orthopedic referral and appt  Follow up with ortho as you may need further testing  Follow up with your PCP as well  Go to the ED if symptoms worsen  Take tylenol or motrin as needed for pain

## 2023-08-14 NOTE — RESULT ENCOUNTER NOTE
Discussed xray results with patient and informed him to keep ortho appointment as he may need a follow up xray. He verbalizes understanding of this.

## 2023-08-14 NOTE — RESULT ENCOUNTER NOTE
Reviewed final xray results. No acute fracture however there is soft tissue swelling and elbow effusion and follow up xray is recommended 7-10 days to help r/o occult fracture. Pt was referred to ortho for 8/26. LM on  for pt to return call to discuss results. Please have your office follow up with pt.

## 2023-08-16 ENCOUNTER — OFFICE VISIT (OUTPATIENT)
Dept: OBGYN CLINIC | Facility: CLINIC | Age: 58
End: 2023-08-16
Payer: COMMERCIAL

## 2023-08-16 VITALS
SYSTOLIC BLOOD PRESSURE: 115 MMHG | TEMPERATURE: 99.6 F | HEIGHT: 68 IN | HEART RATE: 80 BPM | DIASTOLIC BLOOD PRESSURE: 66 MMHG | WEIGHT: 315 LBS | BODY MASS INDEX: 47.74 KG/M2

## 2023-08-16 DIAGNOSIS — M25.521 RIGHT ELBOW PAIN: ICD-10-CM

## 2023-08-16 DIAGNOSIS — S59.901A ELBOW INJURY, RIGHT, INITIAL ENCOUNTER: Primary | ICD-10-CM

## 2023-08-16 PROCEDURE — 99214 OFFICE O/P EST MOD 30 MIN: CPT | Performed by: FAMILY MEDICINE

## 2023-08-16 NOTE — PROGRESS NOTES
[facility] [address]  [phone]  [phone]      Assessment:  1. Elbow injury, right, initial encounter  -     Ambulatory Referral to Orthopedic Surgery    2. Right elbow pain  -     Ambulatory Referral to Orthopedic Surgery        Plan:  Patient Instructions   F/u 1 wk  XR R elbow- 1 wk  Continue to wear sling  Icing/heat/OTC pain meds as needed. Possibly flexor tendon sprain vs occult fracture. Consider CT if no improvement     Return in about 1 week (around 8/23/2023) for Recheck. Chief Complaint:  Chief Complaint   Patient presents with   • Right Elbow - Pain       Subjective:   HPI    Patient ID: Peng Coppola is a 62 y.o. male     Here c/o R elbow pain  He slipped down the steps last Friday and landed on outstretched hand  Seen in . XR done. Sling  Hurts to move certain way. Tylenol PRN  Worse pushing to get up. RIGHT ELBOW     INDICATION:   M25.521: Pain in right elbow.     COMPARISON: 04/09/2015     VIEWS:  XR ELBOW 3+ VW RIGHT  Images: 4     FINDINGS:     There is no acute fracture or dislocation.     There is a joint effusion.     Mild degenerative changes. An olecranon spur is present.     No lytic or blastic osseous lesion.     IMPRESSION:  Soft tissue swelling posterior to the elbow joint. IMPRESSION:     Degenerative changes. Soft tissue swelling. An elbow effusion is present. If clinically appropriate, follow-up plain films in 7 to 10 days would be helpful to exclude an occult fracture. The study was marked in [facility] for significant notification.     Workstation performed: [id number]       Review of Systems   Constitutional: Negative for fatigue and fever. Respiratory: Negative for shortness of breath. Cardiovascular: Negative for chest pain. Gastrointestinal: Negative for abdominal pain and nausea. Genitourinary: Negative for dysuria. Musculoskeletal: Positive for arthralgias.    Skin: Negative for rash and wound.   Neurological: Negative for weakness and headaches. Objective:  Vitals:  /66   Pulse 80   Temp 99.6 °F (37.6 °C) (Tympanic)   Ht 5' 8" (1.727 m)   Wt (!) 196 kg (432 lb)   BMI 65.69 kg/m²     The following portions of the patient's history were reviewed and updated as appropriate: allergies, current medications, past family history, past medical history, past social history, past surgical history, and problem list.    Physical exam:  Physical Exam  Constitutional:       Appearance: Normal appearance. He is normal weight. Eyes:      Extraocular Movements: Extraocular movements intact. Pulmonary:      Effort: Pulmonary effort is normal.   Musculoskeletal:      Cervical back: Normal range of motion. Skin:     General: Skin is warm and dry. Neurological:      General: No focal deficit present. Mental Status: He is alert and oriented to person, place, and time. Mental status is at baseline. Psychiatric:         Mood and Affect: Mood normal.         Behavior: Behavior normal.         Thought Content: Thought content normal.         Judgment: Judgment normal.       Right Elbow Exam     Tenderness   The patient is experiencing tenderness in the medial epicondyle. Range of Motion   Extension: abnormal   Flexion: normal   Pronation: normal   Supination: normal     Muscle Strength   Pronation:  3/5   Supination:  3/5             I have personally reviewed pertinent films in PACS and my interpretation is XR_ R elbow- mod OA,small effusion. no fx seen.       Nicholas Mcdonald MD

## 2023-08-16 NOTE — PATIENT INSTRUCTIONS
F/u 1 wk  XR R elbow- 1 wk  Continue to wear sling  Icing/heat/OTC pain meds as needed. Possibly flexor tendon sprain vs occult fracture.   Consider CT if no improvement

## 2023-08-23 ENCOUNTER — TELEPHONE (OUTPATIENT)
Age: 58
End: 2023-08-23

## 2023-08-23 NOTE — TELEPHONE ENCOUNTER
Caller:  Nakita Rutledge     Doctor/Office: Not spa     Call regarding :  Ortho    Call was transferred to: Ortho

## 2023-08-30 DIAGNOSIS — E66.01 MORBID OBESITY WITH BMI OF 60.0-69.9, ADULT (HCC): ICD-10-CM

## 2023-09-06 ENCOUNTER — OFFICE VISIT (OUTPATIENT)
Dept: FAMILY MEDICINE CLINIC | Facility: CLINIC | Age: 58
End: 2023-09-06
Payer: COMMERCIAL

## 2023-09-06 VITALS
OXYGEN SATURATION: 92 % | TEMPERATURE: 97.5 F | BODY MASS INDEX: 46.65 KG/M2 | SYSTOLIC BLOOD PRESSURE: 114 MMHG | HEIGHT: 69 IN | RESPIRATION RATE: 16 BRPM | HEART RATE: 92 BPM | DIASTOLIC BLOOD PRESSURE: 70 MMHG | WEIGHT: 315 LBS

## 2023-09-06 DIAGNOSIS — M62.838 TRAPEZIUS MUSCLE SPASM: ICD-10-CM

## 2023-09-06 DIAGNOSIS — I25.10 CORONARY ARTERY DISEASE INVOLVING NATIVE CORONARY ARTERY OF NATIVE HEART WITHOUT ANGINA PECTORIS: ICD-10-CM

## 2023-09-06 DIAGNOSIS — I48.91 ATRIAL FIBRILLATION WITH RVR (HCC): ICD-10-CM

## 2023-09-06 DIAGNOSIS — G47.33 SEVERE OBSTRUCTIVE SLEEP APNEA: ICD-10-CM

## 2023-09-06 DIAGNOSIS — Z12.12 SCREENING FOR COLORECTAL CANCER: ICD-10-CM

## 2023-09-06 DIAGNOSIS — E66.01 MORBID OBESITY WITH BMI OF 60.0-69.9, ADULT (HCC): Primary | ICD-10-CM

## 2023-09-06 DIAGNOSIS — Z12.11 SCREENING FOR COLORECTAL CANCER: ICD-10-CM

## 2023-09-06 DIAGNOSIS — M54.50 LOW BACK PAIN, UNSPECIFIED BACK PAIN LATERALITY, UNSPECIFIED CHRONICITY, UNSPECIFIED WHETHER SCIATICA PRESENT: ICD-10-CM

## 2023-09-06 DIAGNOSIS — I10 HYPERTENSION, ESSENTIAL: ICD-10-CM

## 2023-09-06 PROBLEM — Z87.891 FORMER CIGARETTE SMOKER: Status: ACTIVE | Noted: 2018-01-04

## 2023-09-06 PROBLEM — I50.32 CHRONIC DIASTOLIC HEART FAILURE (HCC): Status: ACTIVE | Noted: 2023-02-07

## 2023-09-06 PROCEDURE — 99214 OFFICE O/P EST MOD 30 MIN: CPT | Performed by: FAMILY MEDICINE

## 2023-09-06 RX ORDER — CYCLOBENZAPRINE HCL 10 MG
10 TABLET ORAL 3 TIMES DAILY PRN
Qty: 30 TABLET | Refills: 2 | Status: SHIPPED | OUTPATIENT
Start: 2023-09-06

## 2023-09-06 NOTE — PROGRESS NOTES
Name: Estela Mendez      : 1965      MRN: [id number]  Encounter Provider: Tiff Kaplan DO  Encounter Date: 2023   Encounter department: [address] [place] [address]   Patient given lab requisition for fasting labs as below. Patient will increased Wegovy to 1 mg weekly for the next 4 weeks then plan to increase to 1.7 mg weekly for 4 weeks then increase to 2.4 mg weekly as tolerated. Patient to continue present treatment. Recommend patient limit Tylenol to 1000 mg twice daily and try cyclobenzaprine as needed, caution regarding drowsiness. Instructed to follow a low-fat, low-salt and a low sugar/carbohydrate diet more carefully and to get regular exercise walking as tolerated. Continued weight loss encouraged. Follow-up with specialist as scheduled. Return to the office in 3 months. 1. Morbid obesity with BMI of 60.0-69.9, adult ([address])  -     Semaglutide-Weight Management (WEGOVY) 1 MG/0.5ML; Inject 0.5 mL (1 mg total) under the skin once a week  -     CBC and Platelet; Future  -     Comprehensive metabolic panel; Future  -     Lipid panel; Future    2. Hypertension, essential  -     CBC and Platelet; Future  -     Comprehensive metabolic panel; Future    3. Coronary artery disease involving native coronary artery of native heart without angina pectoris  -     Lipid panel; Future    4. Atrial fibrillation with RVR ([address])    5. Severe obstructive sleep apnea    6. Low back pain, unspecified back pain laterality, unspecified chronicity, unspecified whether sciatica present    7. Trapezius muscle spasm  -     cyclobenzaprine (FLEXERIL) 10 mg tablet; Take 1 tablet (10 mg total) by mouth 3 (three) times a day as needed for muscle spasms    8. Screening for colorectal cancer           Subjective      Patient is here with his wife for follow-up appoint for chronic conditions. He is here for refill of Wegovy and up titration to the next dose.   Patient has tolerated Wegovy well without any side effects. Patient has lost 8 pounds over the first 8 weeks. Patient follows with [facility] cardiology and pulmonology regularly. He uses CPAP nightly. Patient complains of ongoing chronic back pain which significantly limits his activity level. He is currently not working. Patient has been taking Tylenol 4 times a day on a regular basis. Hypertension  This is a chronic problem. The problem is controlled. Pertinent negatives include no anxiety, blurred vision, chest pain, headaches, orthopnea, palpitations, peripheral edema, PND or shortness of breath. Risk factors for coronary artery disease include dyslipidemia, obesity, male gender and smoking/tobacco exposure. Past treatments include ACE inhibitors, beta blockers and diuretics. The current treatment provides significant improvement. Compliance problems include exercise. Hypertensive end-organ damage includes CAD/MI and heart failure. There is no history of CVA. Review of Systems   Eyes: Negative for blurred vision. Respiratory: Negative for shortness of breath. Cardiovascular: Negative for chest pain, palpitations, orthopnea and PND. Neurological: Negative for headaches. Current Outpatient Medications on File Prior to Visit   Medication Sig   • apixaban (ELIQUIS) 5 mg Take 1 tablet by mouth 2 (two) times a day   • atorvastatin (LIPITOR) 80 mg tablet Take 1 tablet by mouth daily   • Blood Pressure Monitoring (Adult Blood Pressure Cuff Lg) KIT Use 2 (two) times a day   • Blood Pressure Monitoring (Adult Blood Pressure Cuff Lg) KIT Use daily   • calcium carbonate-vitamin D 250 mg-3.125 mcg tablet Take 1 tablet by mouth daily   • carvedilol (COREG) 12.5 mg tablet Take 25 mg by mouth 2 (two) times a day   • famotidine (PEPCID) 20 mg tablet Take 1 tablet (20 mg total) by mouth 2 (two) times a day   • lisinopril (ZESTRIL) 10 mg tablet TAKE ONE TABLET BY MOUTH DAILY.    • nitroglycerin (NITROSTAT) 0.4 mg SL tablet Place 1 tablet (0.4 mg total) under the tongue every 5 (five) minutes as needed for chest pain Chest pain   • ondansetron (ZOFRAN) 8 mg tablet Take 1 tablet (8 mg total) by mouth every 8 (eight) hours as needed for nausea or vomiting   • torsemide (DEMADEX) 20 mg tablet    • [DISCONTINUED] Semaglutide-Weight Management (WEGOVY) 0.5 MG/0.5ML Inject 0.5 mL (0.5 mg total) under the skin once a week   • Ventolin  (90 Base) MCG/ACT inhaler  (Patient not taking: Reported on 8/16/2023)   • [DISCONTINUED] cyclobenzaprine (FLEXERIL) 10 mg tablet Take 1 tablet (10 mg total) by mouth 3 (three) times a day as needed for muscle spasms (Patient not taking: Reported on 8/13/2023)       Objective     /70 (BP Location: Left arm, Patient Position: Sitting, Cuff Size: Standard)   Pulse 92   Temp 97.5 °F (36.4 °C) (Tympanic)   Resp 16   Ht 5' 9" (1.753 m)   Wt (!) 193 kg (426 lb)   SpO2 92%   BMI 62.91 kg/m²     Physical Exam  Constitutional:       General: He is not in acute distress. Appearance: Normal appearance. He is obese. HENT:      Head: Normocephalic. Mouth/Throat:      Mouth: Mucous membranes are moist.   Eyes:      General: No scleral icterus. Conjunctiva/sclera: Conjunctivae normal.   Neck:      Vascular: No carotid bruit. Cardiovascular:      Rate and Rhythm: Normal rate. Rhythm irregular. Pulmonary:      Effort: Pulmonary effort is normal.      Breath sounds: Normal breath sounds. Abdominal:      Palpations: Abdomen is soft. Tenderness: There is no abdominal tenderness. Musculoskeletal:      Cervical back: Neck supple. Right lower leg: No edema. Left lower leg: No edema. Lymphadenopathy:      Cervical: No cervical adenopathy. Skin:     General: Skin is warm and dry. Neurological:      General: No focal deficit present. Mental Status: He is alert and oriented to person, place, and time.    Psychiatric:         Mood and Affect: Mood normal.         Behavior: Behavior normal. Thought Content:  Thought content normal.         Judgment: Judgment normal.       [place], DO

## 2023-09-28 ENCOUNTER — TELEPHONE (OUTPATIENT)
Dept: FAMILY MEDICINE CLINIC | Facility: CLINIC | Age: 58
End: 2023-09-28

## 2023-09-28 NOTE — TELEPHONE ENCOUNTER
Patient calling requesting refill on Wegovy. Due to shortage pharmacy only has 1.7mg in stock. Ok to increase? If so send to [facility]    Patient aware provider is out of office.  Patient would like to refill while in stock at pharmacy and prior to leaving for vacation on 10/05/23

## 2023-09-29 DIAGNOSIS — E66.01 MORBID OBESITY WITH BMI OF 60.0-69.9, ADULT (HCC): Primary | ICD-10-CM

## 2023-11-01 DIAGNOSIS — E66.01 MORBID OBESITY WITH BMI OF 60.0-69.9, ADULT (HCC): ICD-10-CM

## 2023-11-01 DIAGNOSIS — E66.01 MORBID OBESITY WITH BMI OF 60.0-69.9, ADULT (HCC): Primary | ICD-10-CM

## 2023-11-01 NOTE — TELEPHONE ENCOUNTER
Semaglutide-Weight Management (WEGOVY) 1.7 MG/0.75ML          Sig: Inject 0.75 mL (1.7 mg total) under the skin once a week    Disp: 3 mL    Refills: 0    Start: 11/1/2023    Class: Normal    Non-formulary For: Morbid obesity with BMI of 60.0-69.9, adult ([address])    Last ordered: 1 month ago (9/29/2023) by Lisa Calixto DO    Endocrinology:  Diabetes - GLP-1 Receptor Agonists [id number]/01/2023 10:48 AM   Protocol Details HBA1C within 180 days    Valid encounter within last 6 months      To be filled at: [address] [address]

## 2023-11-29 DIAGNOSIS — K21.9 GASTROESOPHAGEAL REFLUX DISEASE, UNSPECIFIED WHETHER ESOPHAGITIS PRESENT: ICD-10-CM

## 2023-11-29 RX ORDER — FAMOTIDINE 20 MG/1
20 TABLET, FILM COATED ORAL 2 TIMES DAILY
Qty: 60 TABLET | Refills: 5 | Status: SHIPPED | OUTPATIENT
Start: 2023-11-29 | End: 2024-05-27

## 2024-01-24 DIAGNOSIS — E66.01 MORBID OBESITY WITH BMI OF 60.0-69.9, ADULT (HCC): ICD-10-CM

## 2024-01-24 NOTE — TELEPHONE ENCOUNTER
Semaglutide-Weight Management (WEGOVY) 2.4 MG/0.75ML          Sig: Inject 0.75 mL (2.4 mg total) under the skin once a week    Disp: 3 mL    Refills: 2    Start: 1/24/2024    Class: Normal    Non-formulary For: Morbid obesity with BMI of 60.0-69.9, adult (HCC)    Last ordered: 2 months ago (11/1/2023) by Jovani Weldon DO    Endocrinology:  Diabetes - GLP-1 Receptor Agonists [id number]/24/2024 03:08 PM   Protocol Details HBA1C within 180 days    Valid encounter within last 6 months      To be filled at: [id number] - [place], [place] - [phone]

## 2024-02-13 ENCOUNTER — RA CDI HCC (OUTPATIENT)
Dept: OTHER | Facility: HOSPITAL | Age: 59
End: 2024-02-13

## 2024-02-14 ENCOUNTER — OFFICE VISIT (OUTPATIENT)
Dept: FAMILY MEDICINE CLINIC | Facility: CLINIC | Age: 59
End: 2024-02-14
Payer: COMMERCIAL

## 2024-02-14 VITALS
DIASTOLIC BLOOD PRESSURE: 93 MMHG | SYSTOLIC BLOOD PRESSURE: 133 MMHG | RESPIRATION RATE: 18 BRPM | OXYGEN SATURATION: 94 % | WEIGHT: 315 LBS | BODY MASS INDEX: 46.65 KG/M2 | TEMPERATURE: 97.9 F | HEIGHT: 69 IN | HEART RATE: 100 BPM

## 2024-02-14 DIAGNOSIS — I50.31: ICD-10-CM

## 2024-02-14 DIAGNOSIS — J96.92 RESPIRATORY FAILURE WITH HYPERCAPNIA, UNSPECIFIED CHRONICITY (HCC): ICD-10-CM

## 2024-02-14 DIAGNOSIS — I48.91 ATRIAL FIBRILLATION WITH RVR (HCC): ICD-10-CM

## 2024-02-14 DIAGNOSIS — E66.01 MORBID OBESITY WITH BMI OF 60.0-69.9, ADULT (HCC): ICD-10-CM

## 2024-02-14 DIAGNOSIS — L30.9 DERMATITIS: Primary | ICD-10-CM

## 2024-02-14 PROCEDURE — 99213 OFFICE O/P EST LOW 20 MIN: CPT | Performed by: FAMILY MEDICINE

## 2024-02-14 RX ORDER — CARVEDILOL 25 MG/1
TABLET ORAL
COMMUNITY
Start: 2024-02-09

## 2024-02-14 RX ORDER — CLOTRIMAZOLE AND BETAMETHASONE DIPROPIONATE 10; .64 MG/G; MG/G
CREAM TOPICAL 2 TIMES DAILY
Qty: 30 G | Refills: 1 | Status: SHIPPED | OUTPATIENT
Start: 2024-02-14

## 2024-02-14 NOTE — PROGRESS NOTES
"Assessment/Plan:  Patient will try Lotrisone cream to apply twice daily.  Keep area clean and dry.  Patient is been on Wegovy for almost a year with minimal weight loss and recommend referral back to bariatric medicine further evaluation and treatment.  He will consider this.  Return to the office as needed.  No problem-specific Assessment & Plan notes found for this encounter.       Diagnoses and all orders for this visit:    Dermatitis  -     clotrimazole-betamethasone (LOTRISONE) 1-0.05 % cream; Apply topically 2 (two) times a day    Morbid obesity with BMI of 60.0-69.9, adult (HCC)    Acute diastolic ACC/AHA stage C congestive heart failure (HCC)    Atrial fibrillation with RVR ([facility])    Respiratory failure with hypercapnia, unspecified chronicity (HCC)    Other orders  -     carvedilol (COREG) 25 mg tablet          Subjective:      Patient ID: Gopal Oro is a 59 y.o. male.    Patient complains of an itchy rash on the top of his right great toe for the past 1 and half weeks.  He admits to rubbing and scratching it and the skin peeling off.  Patient has been treating this with Neosporin ointment with some relief.    Rash  This is a new problem. The current episode started 1 to 4 weeks ago. The problem is unchanged. The affected locations include the right toes. The rash is characterized by redness, itchiness and peeling. Past treatments include antibiotic cream. The treatment provided mild relief.       The following portions of the patient's history were reviewed and updated as appropriate: allergies, current medications, past family history, past medical history, past social history, past surgical history, and problem list.    Review of Systems   Skin:  Positive for rash.         Objective:      /93 (BP Location: Left arm, Patient Position: Sitting, Cuff Size: Large)   Pulse 100   Temp 97.9 °F (36.6 °C) (Tympanic)   Resp 18   Ht 5' 9\" (1.753 m)   Wt (!) 194 kg (427 lb 6.4 oz)   SpO2 94%   BMI 63.12 " kg/m²          Physical Exam  Constitutional:       General: He is not in acute distress.     Appearance: Normal appearance. He is obese.   HENT:      Head: Normocephalic.      Mouth/Throat:      Mouth: Mucous membranes are moist.   Eyes:      General: No scleral icterus.     Conjunctiva/sclera: Conjunctivae normal.   Cardiovascular:      Rate and Rhythm: Normal rate and regular rhythm.   Pulmonary:      Effort: Pulmonary effort is normal.      Breath sounds: Normal breath sounds.   Abdominal:      Palpations: Abdomen is soft.      Tenderness: There is no abdominal tenderness.   Musculoskeletal:      Cervical back: Neck supple.      Right lower leg: No edema.      Left lower leg: No edema.   Lymphadenopathy:      Cervical: No cervical adenopathy.   Skin:     General: Skin is warm and dry.      Findings: Rash present.      Comments: Erythematous excoriated rash dorsal aspect of right great toe.   Neurological:      General: No focal deficit present.      Mental Status: He is alert and oriented to person, place, and time.   Psychiatric:         Mood and Affect: Mood normal.         Behavior: Behavior normal.         Thought Content: Thought content normal.         Judgment: Judgment normal.

## 2024-04-13 DIAGNOSIS — E66.01 MORBID OBESITY WITH BMI OF 60.0-69.9, ADULT (HCC): ICD-10-CM

## 2024-04-13 RX ORDER — SEMAGLUTIDE 2.4 MG/.75ML
2.4 INJECTION, SOLUTION SUBCUTANEOUS WEEKLY
Qty: 3 ML | Refills: 0 | Status: SHIPPED | OUTPATIENT
Start: 2024-04-13

## 2024-04-26 ENCOUNTER — TRANSITIONAL CARE MANAGEMENT (OUTPATIENT)
Dept: FAMILY MEDICINE CLINIC | Facility: CLINIC | Age: 59
End: 2024-04-26

## 2024-04-26 ENCOUNTER — TELEPHONE (OUTPATIENT)
Age: 59
End: 2024-04-26

## 2024-04-26 NOTE — TELEPHONE ENCOUNTER
Appointment scheduled with provider.    Reason: Office Visit     Symptoms: Follow up for ER visit 4/25/2024     Provider: Dr. Sohail Pimentel    Date/Time: Monday 4/29/2024 at 2:15 pm

## 2024-04-29 ENCOUNTER — OFFICE VISIT (OUTPATIENT)
Dept: FAMILY MEDICINE CLINIC | Facility: CLINIC | Age: 59
End: 2024-04-29
Payer: COMMERCIAL

## 2024-04-29 VITALS
HEIGHT: 69 IN | WEIGHT: 315 LBS | BODY MASS INDEX: 46.65 KG/M2 | OXYGEN SATURATION: 99 % | SYSTOLIC BLOOD PRESSURE: 144 MMHG | HEART RATE: 104 BPM | DIASTOLIC BLOOD PRESSURE: 94 MMHG | TEMPERATURE: 97.8 F

## 2024-04-29 DIAGNOSIS — E66.01 MORBID OBESITY WITH BMI OF 60.0-69.9, ADULT (HCC): ICD-10-CM

## 2024-04-29 DIAGNOSIS — H81.13 BENIGN PAROXYSMAL POSITIONAL VERTIGO DUE TO BILATERAL VESTIBULAR DISORDER: Primary | ICD-10-CM

## 2024-04-29 DIAGNOSIS — K21.9 GASTROESOPHAGEAL REFLUX DISEASE, UNSPECIFIED WHETHER ESOPHAGITIS PRESENT: ICD-10-CM

## 2024-04-29 PROCEDURE — 99214 OFFICE O/P EST MOD 30 MIN: CPT | Performed by: FAMILY MEDICINE

## 2024-04-29 RX ORDER — MECLIZINE HYDROCHLORIDE 25 MG/1
25 TABLET ORAL 3 TIMES DAILY PRN
Qty: 30 TABLET | Refills: 0 | Status: SHIPPED | OUTPATIENT
Start: 2024-04-29

## 2024-04-29 NOTE — PROGRESS NOTES
Assessment/Plan: Patient is planning to start physical therapy as an outpatient tomorrow.  Continue Wegovy for weight loss.  He is gradually losing weight.  Prescribed meclizine to be used as needed for dizziness.  Side effect profile of medication reviewed.  Recommend return to office for recheck if no improvement or worsening symptoms.  Consider neurology evaluation if needed.  Otherwise recommend recheck with Dr. Weldon as scheduled in 2 to 3 months or sooner if needed.     1. Benign paroxysmal positional vertigo due to bilateral vestibular disorder  -     meclizine (ANTIVERT) 25 mg tablet; Take 1 tablet (25 mg total) by mouth 3 (three) times a day as needed for dizziness    2. Morbid obesity with BMI of 60.0-69.9, adult ([facility])          Subjective:      Patient ID: Gopal Oro is a 59 y.o. male.    Patient here for follow-up after recent ER evaluation for benign positional vertigo.  Epley maneuver was done in the ER setting and provided almost immediate relief but he continues to have lingering mild to moderate vertigo symptoms intermittently over the last 2 days since onset of symptoms.  He denies any headaches.  He is not currently taking any medication.  He is planning to follow-up with physical therapist tomorrow for further treatment.    He is losing weight gradually with GLP-1 medication for weight loss.             The following portions of the patient's history were reviewed and updated as appropriate: allergies, current medications, past family history, past medical history, past social history, past surgical history, and problem list.    Review of Systems   Constitutional: Negative.    HENT: Negative.     Eyes: Negative.    Respiratory: Negative.     Cardiovascular: Negative.    Gastrointestinal: Negative.    Endocrine: Negative.    Genitourinary: Negative.    Musculoskeletal: Negative.    Skin: Negative.    Allergic/Immunologic: Negative.    Neurological:  Positive for dizziness.   Hematological:  "Negative.    Psychiatric/Behavioral: Negative.           Objective:      /94 (BP Location: Left arm, Patient Position: Sitting, Cuff Size: Adult) Comment: forearm  Pulse 104   Temp 97.8 °F (36.6 °C)   Ht 5' 9\" (1.753 m)   Wt (!) 188 kg (414 lb)   SpO2 99%   BMI 61.14 kg/m²          Physical Exam  Vitals reviewed.   Constitutional:       Appearance: He is well-developed.   HENT:      Head: Normocephalic and atraumatic.      Right Ear: External ear normal. Tympanic membrane is not erythematous or bulging.      Left Ear: External ear normal. Tympanic membrane is not erythematous or bulging.      Nose: Nose normal.      Mouth/Throat:      Mouth: No oral lesions.      Pharynx: No oropharyngeal exudate.   Eyes:      General: No scleral icterus.        Right eye: No discharge.         Left eye: No discharge.      Conjunctiva/sclera: Conjunctivae normal.   Neck:      Thyroid: No thyromegaly.   Cardiovascular:      Rate and Rhythm: Normal rate and regular rhythm.      Heart sounds: Normal heart sounds. No murmur heard.     No friction rub. No gallop.   Pulmonary:      Effort: Pulmonary effort is normal. No respiratory distress.      Breath sounds: No wheezing or rales.   Chest:      Chest wall: No tenderness.   Abdominal:      General: Bowel sounds are normal. There is no distension.      Palpations: Abdomen is soft. There is no mass.      Tenderness: There is no abdominal tenderness. There is no guarding or rebound.   Musculoskeletal:         General: No tenderness or deformity. Normal range of motion.      Cervical back: Normal range of motion and neck supple.   Lymphadenopathy:      Cervical: No cervical adenopathy.   Skin:     General: Skin is warm and dry.      Coloration: Skin is not pale.      Findings: No erythema or rash.   Neurological:      Mental Status: He is alert and oriented to person, place, and time.      Cranial Nerves: No cranial nerve deficit.      Motor: No abnormal muscle tone.      " Coordination: Coordination normal.      Deep Tendon Reflexes: Reflexes are normal and symmetric.   Psychiatric:         Behavior: Behavior normal.

## 2024-04-30 RX ORDER — FAMOTIDINE 20 MG/1
20 TABLET, FILM COATED ORAL 2 TIMES DAILY
Qty: 60 TABLET | Refills: 0 | Status: SHIPPED | OUTPATIENT
Start: 2024-04-30 | End: 2024-10-27

## 2024-05-07 DIAGNOSIS — E66.01 MORBID OBESITY WITH BMI OF 60.0-69.9, ADULT (HCC): ICD-10-CM

## 2024-05-08 RX ORDER — SEMAGLUTIDE 2.4 MG/.75ML
2.4 INJECTION, SOLUTION SUBCUTANEOUS WEEKLY
Qty: 3 ML | Refills: 2 | Status: SHIPPED | OUTPATIENT
Start: 2024-05-08

## 2024-05-08 NOTE — TELEPHONE ENCOUNTER
john from pharmacy: Wegovy Subcutaneous Solution Auto-injector 2.4 MG/0.75ML          Will file in chart as: Wegovy 2.4 MG/0.75ML    Sig: Inject 0.75 mL (2.4 mg total) under the skin once a week    Disp: 3 mL    Refills: 0    Start: 5/7/2024    Class: Normal    Non-formulary For: Morbid obesity with BMI of 60.0-69.9, adult ([facility])    Last ordered: 3 weeks ago (4/13/2024) by Jovani Weldon DO    Last refill: 3/21/2024    Rx #: [id number]    Endocrinology:  Diabetes - GLP-1 Receptor Agonists Rjrtmj6105/07/2024 06:53 PM   Protocol Details Valid encounter within last 6 months    Diabetes is not on problem list      To be filled at: [id number] - [place] - [phone]

## 2024-05-22 DIAGNOSIS — E66.01 MORBID OBESITY WITH BMI OF 60.0-69.9, ADULT (HCC): ICD-10-CM

## 2024-05-22 RX ORDER — SEMAGLUTIDE 2.4 MG/.75ML
2.4 INJECTION, SOLUTION SUBCUTANEOUS WEEKLY
Qty: 3 ML | Refills: 0 | Status: SHIPPED | OUTPATIENT
Start: 2024-05-22

## 2024-05-22 NOTE — TELEPHONE ENCOUNTER
Reason for call:   [x] Refill   [] Prior Auth  [] Other:     Office:   [x] PCP/Provider - Jovani Weldon DO   [] Specialty/Provider -     Medication: Semaglutide-Weight Management (Wegovy) 2.4 MG/0.75ML     Dose/Frequency: 2.4 mg, Subcutaneous, Weekly     Quantity:  3 mL     Pharmacy:  [id number] [id number]     Does the patient have enough for 3 days?   [] Yes   [x] No - Send as HP to POD

## 2024-05-22 NOTE — TELEPHONE ENCOUNTER
Patient called stating he is out of medication and needs to take his next dose tomorrow, 5/23/24.  Please send refill to [id number] - [place] - [phone].

## 2024-05-23 ENCOUNTER — TELEPHONE (OUTPATIENT)
Dept: FAMILY MEDICINE CLINIC | Facility: CLINIC | Age: 59
End: 2024-05-23

## 2024-05-23 NOTE — TELEPHONE ENCOUNTER
Received fax from Michelle regarding prior auth request. Sent to Prior Auth dept. Scanned into media.     Key:[id number]    Med: Wegovy 2.4 Mg/ 0.75 ML

## 2024-05-23 NOTE — TELEPHONE ENCOUNTER
Patient called in regarding the Wegovy since he hadn't heard anything. Advised patient the Wegovy needed a prior auth and that everything was faxed over to prior auth department- Advised that we are waiting on the insurance company now. Patient understood.

## 2024-05-24 DIAGNOSIS — K21.9 GASTROESOPHAGEAL REFLUX DISEASE, UNSPECIFIED WHETHER ESOPHAGITIS PRESENT: ICD-10-CM

## 2024-05-24 NOTE — TELEPHONE ENCOUNTER
Pt's spouse called a 2nd time requesting an update on prior authorization. Pt advised still awaiting authorization from insurance co.

## 2024-05-25 RX ORDER — FAMOTIDINE 20 MG/1
20 TABLET, FILM COATED ORAL 2 TIMES DAILY
Qty: 60 TABLET | Refills: 3 | Status: SHIPPED | OUTPATIENT
Start: 2024-05-25 | End: 2024-11-21

## 2024-05-29 NOTE — TELEPHONE ENCOUNTER
Patient called, requesting a return phone call.  He received a text message regarding an authorization, is confused regarding the message.   Was the prior auth approved for Wegovy?  If yes, kindly sent to the pharmacy on file.  If no, patient is requesting an update, why did he receive a text message?

## 2024-05-29 NOTE — TELEPHONE ENCOUNTER
PA for Wegovy 2.4mg    Submitted via    [x]CMM-KEY [id number]  []SurescriSaharey-Case ID #   []Faxed to plan   []Other website   []Phone call Case ID #     Office notes sent, clinical questions answered. Awaiting determination    Turnaround time for your insurance to make a decision on your Prior Authorization can take 7-21 business days.

## 2024-05-30 NOTE — TELEPHONE ENCOUNTER
PA for Wegovy 2.4mg Approved     Date(s) approved 05/29/2024-05/29/2025    Case #    Patient advised by          [] MyChart Message  [] Phone call   []LMOM  []L/M to call office as no active Communication consent on file  []Unable to leave detailed message as VM not approved on Communication consent       Pharmacy advised by    [x]Fax  []Phone call    Approval letter scanned into Media Yes

## 2024-06-04 ENCOUNTER — OFFICE VISIT (OUTPATIENT)
Dept: URGENT CARE | Age: 59
End: 2024-06-04
Payer: COMMERCIAL

## 2024-06-04 ENCOUNTER — APPOINTMENT (OUTPATIENT)
Dept: RADIOLOGY | Age: 59
End: 2024-06-04
Payer: COMMERCIAL

## 2024-06-04 VITALS — OXYGEN SATURATION: 99 % | TEMPERATURE: 100 F | RESPIRATION RATE: 18 BRPM | HEART RATE: 95 BPM

## 2024-06-04 DIAGNOSIS — M25.572 ACUTE LEFT ANKLE PAIN: ICD-10-CM

## 2024-06-04 DIAGNOSIS — M25.572 ACUTE LEFT ANKLE PAIN: Primary | ICD-10-CM

## 2024-06-04 PROCEDURE — G0382 LEV 3 HOSP TYPE B ED VISIT: HCPCS | Performed by: EMERGENCY MEDICINE

## 2024-06-04 PROCEDURE — S9083 URGENT CARE CENTER GLOBAL: HCPCS | Performed by: EMERGENCY MEDICINE

## 2024-06-04 PROCEDURE — 73630 X-RAY EXAM OF FOOT: CPT

## 2024-06-04 PROCEDURE — 73610 X-RAY EXAM OF ANKLE: CPT

## 2024-06-04 NOTE — PATIENT INSTRUCTIONS
Wear Ace wrap as discussed  Tylenol/ibuprofen as needed  Ice 20 minutes 3-4 times per day  Insulate the skin from the ice to prevent frostbite  Rest and Elevate  Follow up with orthopedic if symptoms do not improve  Follow up with PCP in 3-5 days.  Proceed to ER if symptoms worsen.

## 2024-06-04 NOTE — PROGRESS NOTES
[facility]        NAME: Gopal Oro is a 59 y.o. male  : 1965    MRN: [id number]  DATE: 2024  TIME: 7:07 PM    Assessment and Plan   Acute left ankle pain [M25.572]  1. Acute left ankle pain  XR foot 3+ vw left    XR ankle 3+ vw left        Left foot and ankle xrays reviewed: No acute abnormalities noted on preliminary read pending radiology final read.    Discussed with patient at length to seek further evaluation in the ER if experiencing any worsening symptoms, including but not limited to chest pain, SOB, increased ankle pain, increased ankle swelling, calf swelling, left leg swelling, pitting edema, numbness, tingling, cough, fever, etc.  Patient verbalized understanding and agreed to treatment plan at this time.    Patient Instructions     Wear Ace wrap as discussed  Tylenol/ibuprofen as needed  Ice 20 minutes 3-4 times per day  Insulate the skin from the ice to prevent frostbite  Rest and Elevate  Follow up with orthopedic if symptoms do not improve  Follow up with PCP in 3-5 days.  Proceed to ER if symptoms worsen.    If tests are performed, our office will contact you with results only if changes need to made to the care plan discussed with you at the visit. You can review your full results on [facility].    Chief Complaint     Chief Complaint   Patient presents with    Ankle Pain     Left ankle pain starting yesterday. Denies any injury to ankle. Painful also at rest         History of Present Illness       Patient is a 60 yo male with significant PMH of HTN, STEMI, CAD, A-fib, and CHF presenting in the clinic today for left foot ankle pain x 1 day. Denies recent injuries, trauma, and/or falls. Patient locates their pain to the medial and anterior aspect of the left ankle. Admits swelling along medial and anterior left ankle. Admits pain is exacerbated with walking. Denies fever, chills, numbness, tingling, bruising, erythema, warmth, chest pain, and SOB. Admits the use of  ice therapy for symptom management. Denies prior similar injuries.  Patient notes he takes Eliquis daily.  Denies recent travel.  Denies recent surgeries.  Denies active cancer.        Review of Systems   Review of Systems   Constitutional:  Negative for chills and fever.   Respiratory:  Negative for shortness of breath.    Cardiovascular:  Negative for chest pain.   Musculoskeletal:  Positive for arthralgias and joint swelling.   Skin:  Negative for rash and wound.   Neurological:  Negative for numbness.         Current Medications       Current Outpatient Medications:     apixaban (ELIQUIS) 5 mg, Take 1 tablet by mouth 2 (two) times a day, Disp: , Rfl:     atorvastatin (LIPITOR) 80 mg tablet, Take 1 tablet by mouth daily, Disp: , Rfl:     calcium carbonate-vitamin D 250 mg-3.125 mcg tablet, Take 1 tablet by mouth daily, Disp: , Rfl:     carvedilol (COREG) 25 mg tablet, , Disp: , Rfl:     clotrimazole-betamethasone (LOTRISONE) 1-0.05 % cream, Apply topically 2 (two) times a day, Disp: 30 g, Rfl: 1    famotidine (PEPCID) 20 mg tablet, Take 1 tablet (20 mg total) by mouth 2 (two) times a day., Disp: 60 tablet, Rfl: 3    lisinopril (ZESTRIL) 10 mg tablet, TAKE ONE TABLET BY MOUTH DAILY., Disp: , Rfl:     meclizine (ANTIVERT) 25 mg tablet, Take 1 tablet (25 mg total) by mouth 3 (three) times a day as needed for dizziness, Disp: 30 tablet, Rfl: 0    nitroglycerin (NITROSTAT) 0.4 mg SL tablet, Place 1 tablet (0.4 mg total) under the tongue every 5 (five) minutes as needed for chest pain Chest pain, Disp: 25 tablet, Rfl: 2    ondansetron (ZOFRAN) 8 mg tablet, Take 1 tablet (8 mg total) by mouth every 8 (eight) hours as needed for nausea or vomiting, Disp: 20 tablet, Rfl: 0    Semaglutide-Weight Management (Wegovy) 2.4 MG/0.75ML, Inject 0.75 mL (2.4 mg total) under the skin once a week, Disp: 3 mL, Rfl: 0    torsemide (DEMADEX) 20 mg tablet, , Disp: , Rfl:     Ventolin  (90 Base) MCG/ACT inhaler, , Disp: , Rfl:      Blood Pressure Monitoring (Adult Blood Pressure Cuff Lg) KIT, Use 2 (two) times a day, Disp: 1 kit, Rfl: 0    cyclobenzaprine (FLEXERIL) 10 mg tablet, Take 1 tablet (10 mg total) by mouth 3 (three) times a day as needed for muscle spasms, Disp: 30 tablet, Rfl: 2    Current Allergies     Allergies as of 06/04/2024    (No Known Allergies)            The following portions of the patient's history were reviewed and updated as appropriate: allergies, current medications, past family history, past medical history, past social history, past surgical history and problem list.     Past Medical History:   Diagnosis Date    Myocardial infarct (HCC)        No past surgical history on file.    Family History   Problem Relation Age of Onset    Heart disease Mother     No Known Problems Father     Cancer Neg Hx     Diabetes Neg Hx     Stroke Neg Hx          Medications have been verified.        Objective   Pulse 95   Temp 100 °F (37.8 °C)   Resp 18   SpO2 99%        Physical Exam     Physical Exam  Vitals reviewed.   Constitutional:       General: He is not in acute distress.     Appearance: Normal appearance. He is obese. He is not ill-appearing.      Comments: Morbidly obese   HENT:      Head: Normocephalic.      Nose: Nose normal. No congestion or rhinorrhea.      Mouth/Throat:      Mouth: Mucous membranes are moist.   Eyes:      General:         Right eye: No discharge.         Left eye: No discharge.      Conjunctiva/sclera: Conjunctivae normal.   Cardiovascular:      Rate and Rhythm: Normal rate and regular rhythm.      Pulses: Normal pulses.      Heart sounds: Normal heart sounds.      No friction rub. No gallop.   Pulmonary:      Effort: Pulmonary effort is normal.      Breath sounds: Normal breath sounds. No wheezing, rhonchi or rales.   Musculoskeletal:      Cervical back: Normal range of motion and neck supple.      Right lower leg: Normal.      Left lower leg: Tenderness (medial distal aspect) present. No bony  tenderness. Edema (along medial distal aspect) present.      Right ankle: Normal.      Left ankle: Swelling (medial aspect) present. No deformity or lacerations. Tenderness present. No lateral malleolus, medial malleolus, base of 5th metatarsal or proximal fibula tenderness. Normal range of motion. Anterior drawer test negative. Normal pulse.      Right foot: Normal.      Left foot: Normal. Normal range of motion and normal capillary refill. No tenderness. Normal pulse.      Comments: No pitting edema noted.  No calf swelling noted.  No entire left leg swelling noted.   Feet:      Left foot:      Toenail Condition: Left toenails are abnormally thick.   Skin:     General: Skin is warm.      Findings: No rash.   Neurological:      Mental Status: He is alert.   Psychiatric:         Mood and Affect: Mood normal.         Behavior: Behavior normal.

## 2024-06-05 ENCOUNTER — VBI (OUTPATIENT)
Dept: ADMINISTRATIVE | Facility: OTHER | Age: 59
End: 2024-06-05

## 2024-06-24 DIAGNOSIS — E66.01 MORBID OBESITY WITH BMI OF 60.0-69.9, ADULT (HCC): ICD-10-CM

## 2024-06-25 RX ORDER — SEMAGLUTIDE 2.4 MG/.75ML
2.4 INJECTION, SOLUTION SUBCUTANEOUS WEEKLY
Qty: 3 ML | Refills: 0 | Status: SHIPPED | OUTPATIENT
Start: 2024-06-25

## 2024-07-22 DIAGNOSIS — E66.01 MORBID OBESITY WITH BMI OF 60.0-69.9, ADULT (HCC): ICD-10-CM

## 2024-07-22 RX ORDER — SEMAGLUTIDE 2.4 MG/.75ML
2.4 INJECTION, SOLUTION SUBCUTANEOUS WEEKLY
Qty: 3 ML | Refills: 0 | Status: SHIPPED | OUTPATIENT
Start: 2024-07-22

## 2024-07-22 NOTE — TELEPHONE ENCOUNTER
Semaglutide-Weight Management (Wegovy) 2.4 MG/0.75ML          Sig: Inject 0.75 mL (2.4 mg total) under the skin once a week    Disp: 3 mL    Refills: 0    Start: 7/22/2024    Class: Normal    Non-formulary For: Morbid obesity with BMI of 60.0-69.9, adult (HCC)    Last ordered: 3 weeks ago (6/25/2024) by Jovani Weldon DO    Endocrinology:  Diabetes - GLP-1 Receptor Agonists [id number]/22/2024 10:19 AM   Protocol Details Valid encounter within last 6 months    Diabetes is not on problem list      To be filled at: [id number] - [place], [place] - [address]

## 2024-07-22 NOTE — TELEPHONE ENCOUNTER
Patient called, requesting refill on the following medication    Medication:     Semaglutide-Weight Management (Wegovy) 2.4 MG/0.75ML       Dose/Frequency:     Inject 0.75 mL (2.4 mg total) under the skin once a week       Quantity: 3ml    Pharmacy: [id number] - [place], [place] - [address]     Office:   [x] PCP/Provider - Jovani Weldon, DO   [] Speciality/Provider -     Does the patient have enough for 3 days?   [x] Yes   [] No - Send as HP to POD

## 2024-07-23 ENCOUNTER — OFFICE VISIT (OUTPATIENT)
Dept: FAMILY MEDICINE CLINIC | Facility: CLINIC | Age: 59
End: 2024-07-23
Payer: COMMERCIAL

## 2024-07-23 VITALS
HEIGHT: 69 IN | RESPIRATION RATE: 18 BRPM | BODY MASS INDEX: 46.65 KG/M2 | HEART RATE: 100 BPM | WEIGHT: 315 LBS | OXYGEN SATURATION: 96 % | TEMPERATURE: 97.2 F | DIASTOLIC BLOOD PRESSURE: 90 MMHG | SYSTOLIC BLOOD PRESSURE: 146 MMHG

## 2024-07-23 DIAGNOSIS — J44.9 CHRONIC OBSTRUCTIVE PULMONARY DISEASE, UNSPECIFIED COPD TYPE (HCC): ICD-10-CM

## 2024-07-23 DIAGNOSIS — M10.9 ACUTE GOUT OF LEFT ANKLE, UNSPECIFIED CAUSE: Primary | ICD-10-CM

## 2024-07-23 PROBLEM — I25.10 CORONARY ARTERY DISEASE INVOLVING NATIVE CORONARY ARTERY OF NATIVE HEART WITHOUT ANGINA PECTORIS: Chronic | Status: ACTIVE | Noted: 2018-01-23

## 2024-07-23 PROBLEM — I48.91 NEW ONSET A-FIB (HCC): Status: ACTIVE | Noted: 2018-01-23

## 2024-07-23 LAB — URATE SERPL-MCNC: 10.3 MG/DL (ref 3.5–7)

## 2024-07-23 PROCEDURE — 99213 OFFICE O/P EST LOW 20 MIN: CPT | Performed by: FAMILY MEDICINE

## 2024-07-23 RX ORDER — METHYLPREDNISOLONE 4 MG/1
TABLET ORAL
Qty: 21 EACH | Refills: 0 | Status: SHIPPED | OUTPATIENT
Start: 2024-07-23

## 2024-07-23 NOTE — PROGRESS NOTES
Assessment/Plan:   Patient given lab requisition for uric acid level.  Will heed results.  Discussed medical treatment if uric acid is elevated.  Patient was started on a Medrol Dosepak.  Patient may apply ice as needed, rest and leg elevation.  Discussed low purine diet.  Return to the office in 1 week or call sooner as needed.   Diagnoses and all orders for this visit:    Acute gout of left ankle, unspecified cause  -     Uric acid; Future  -     Uric acid  -     methylPREDNISolone 4 MG tablet therapy pack; Use as directed on package    Chronic obstructive pulmonary disease, unspecified COPD type (HCC)          Subjective:     Patient ID: Gopal Oro is a 59 y.o. male.    Patient is here with his wife complains of recurrent gout attack of the left ankle since yesterday morning.  Patient denies any specific injury or fall or twisting his ankle.  Patient was seen at [facility] ER 6 weeks ago with similar symptoms and had x-ray of his left ankle and foot which revealed arthritis and was treated with prednisone with significant relief.    Ankle Pain   The incident occurred 12 to 24 hours ago. There was no injury mechanism. The pain is present in the left ankle. The quality of the pain is described as stabbing. The pain has been Constant since onset. Pertinent negatives include no inability to bear weight, loss of motion, muscle weakness, numbness or tingling. The symptoms are aggravated by weight bearing and movement. He has tried acetaminophen, rest and elevation for the symptoms. The treatment provided no relief.       Review of Systems   Neurological:  Negative for tingling and numbness.         Objective:     Physical Exam  Constitutional:       General: He is not in acute distress.     Appearance: Normal appearance. He is obese. He is not ill-appearing, toxic-appearing or diaphoretic.   HENT:      Head: Normocephalic.      Mouth/Throat:      Mouth: Mucous membranes are moist.   Eyes:      General: No scleral  icterus.     Conjunctiva/sclera: Conjunctivae normal.   Cardiovascular:      Rate and Rhythm: Normal rate and regular rhythm.   Pulmonary:      Effort: Pulmonary effort is normal.      Breath sounds: Normal breath sounds.   Abdominal:      Palpations: Abdomen is soft.      Tenderness: There is no abdominal tenderness.   Musculoskeletal:         General: Tenderness present.      Cervical back: Neck supple.      Right lower leg: No edema.      Left lower leg: No edema.      Comments: Left ankle range of motion intact.  Mild swelling and joint tenderness.  Negative erythema or increased warmth.   Lymphadenopathy:      Cervical: No cervical adenopathy.   Skin:     General: Skin is warm and dry.   Neurological:      General: No focal deficit present.      Mental Status: He is alert and oriented to person, place, and time.   Psychiatric:         Mood and Affect: Mood normal.         Behavior: Behavior normal.         Thought Content: Thought content normal.         Judgment: Judgment normal.

## 2024-07-24 ENCOUNTER — TELEPHONE (OUTPATIENT)
Dept: FAMILY MEDICINE CLINIC | Facility: CLINIC | Age: 59
End: 2024-07-24

## 2024-07-24 DIAGNOSIS — M10.9 ACUTE GOUT OF LEFT ANKLE, UNSPECIFIED CAUSE: Primary | ICD-10-CM

## 2024-07-24 RX ORDER — ALLOPURINOL 300 MG/1
300 TABLET ORAL DAILY
Qty: 30 TABLET | Refills: 5 | Status: SHIPPED | OUTPATIENT
Start: 2024-07-24

## 2024-07-24 NOTE — TELEPHONE ENCOUNTER
LMOM for patient to contact the office regarding the Uric Acid results. Ok to inform him of Dr. Weldon's response.

## 2024-07-24 NOTE — TELEPHONE ENCOUNTER
Pt called back to hear results status. I read the following:    ----- Message from Jovani Weldon DO sent at 7/24/2024 10:46 AM EDT -----  Uric acid level is significantly elevated.  Recommend patient continue present treatment with Medrol Dosepak.  Patient will be started on allopurinol 300 mg daily after acute gout attack has resolved.  Recommend patient wait at least 1 week until starting allopurinol.    Pt had understanding of results.

## 2024-07-24 NOTE — TELEPHONE ENCOUNTER
----- Message from Jovani Weldon DO sent at 7/24/2024 10:46 AM EDT -----  Uric acid level is significantly elevated.  Recommend patient continue present treatment with Medrol Dosepak.  Patient will be started on allopurinol 300 mg daily after acute gout attack has resolved.  Recommend patient wait at least 1 week until starting allopurinol.

## 2024-07-26 ENCOUNTER — TELEPHONE (OUTPATIENT)
Age: 59
End: 2024-07-26

## 2024-07-26 NOTE — TELEPHONE ENCOUNTER
Ailyn is a nurse  with [facility]. She reached out to the patient 3 times with no response. They do not call the patient more than 3 times as to not be intrusive. She wanted to contact us to share her info and asks that we pass the info on to the patient when he comes in next. She sees he has some cardiac issues and thinks he would benefit from a  for care coordination. She can be contacted at [phone] extension [phone]

## 2024-08-15 DIAGNOSIS — E66.01 MORBID OBESITY WITH BMI OF 60.0-69.9, ADULT (HCC): ICD-10-CM

## 2024-08-15 RX ORDER — SEMAGLUTIDE 2.4 MG/.75ML
2.4 INJECTION, SOLUTION SUBCUTANEOUS WEEKLY
Qty: 3 ML | Refills: 0 | Status: SHIPPED | OUTPATIENT
Start: 2024-08-15

## 2024-09-13 DIAGNOSIS — E66.01 MORBID OBESITY WITH BMI OF 60.0-69.9, ADULT (HCC): ICD-10-CM

## 2024-09-16 RX ORDER — SEMAGLUTIDE 2.4 MG/.75ML
2.4 INJECTION, SOLUTION SUBCUTANEOUS WEEKLY
Qty: 3 ML | Refills: 2 | Status: SHIPPED | OUTPATIENT
Start: 2024-09-16

## 2024-09-16 NOTE — TELEPHONE ENCOUNTER
Name from pharmacy: Wegovy Subcutaneous Solution Auto-injector 2.4 MG/0.75ML         Will file in chart as: Wegovy 2.4 MG/0.75ML    Sig: Inject 0.75 mL (2.4 mg total) under the skin once a week    Disp: 3 mL    Refills: 0    Start: 9/13/2024    Class: Normal    Non-formulary For: Morbid obesity with BMI of 60.0-69.9, adult ([facility])    Last ordered: 1 month ago (8/15/2024) by Jovani Weldon DO    Last refill: 8/16/2024    Rx #: [id number]    Endocrinology:  Diabetes - GLP-1 Receptor Agonists Olcfqm4909/13/2024 08:01 PM   Protocol Details Valid encounter within last 6 months    Diabetes is not on problem list      To be filled at: [id number] - [place] - [address]

## 2024-10-21 DIAGNOSIS — K21.9 GASTROESOPHAGEAL REFLUX DISEASE, UNSPECIFIED WHETHER ESOPHAGITIS PRESENT: ICD-10-CM

## 2024-10-22 RX ORDER — FAMOTIDINE 20 MG/1
20 TABLET, FILM COATED ORAL 2 TIMES DAILY
Qty: 60 TABLET | Refills: 3 | Status: SHIPPED | OUTPATIENT
Start: 2024-10-22 | End: 2025-04-20

## 2024-12-07 DIAGNOSIS — E66.01 MORBID OBESITY WITH BMI OF 60.0-69.9, ADULT (HCC): ICD-10-CM

## 2024-12-09 RX ORDER — SEMAGLUTIDE 2.4 MG/.75ML
2.4 INJECTION, SOLUTION SUBCUTANEOUS WEEKLY
Qty: 3 ML | Refills: 0 | Status: SHIPPED | OUTPATIENT
Start: 2024-12-09

## 2024-12-09 NOTE — TELEPHONE ENCOUNTER
Name from pharmacy: Wegovy Subcutaneous Solution Auto-injector 2.4 MG/0.75ML          Will file in chart as: Wegovy 2.4 MG/0.75ML     Possible duplicate: Brooks to review recent actions on this medication    Sig: Inject 0.75 mL (2.4 mg total) under the skin once a week    Disp: 3 mL    Refills: 0    Start: 12/7/2024    Class: Normal    Non-formulary For: Morbid obesity with BMI of 60.0-69.9, adult (HCC)    Last ordered: 2 months ago (9/16/2024) by Jovani Weldon DO    Last refill: 11/12/2024    Rx #: [id number]    Endocrinology:  Diabetes - GLP-1 Receptor Agonists [id number]/07/2024 11:48 AM   Protocol Details Valid encounter within last 6 months      To be filled at: [id number] - [place] - [address]

## 2025-01-01 DIAGNOSIS — E66.01 MORBID OBESITY WITH BMI OF 60.0-69.9, ADULT (HCC): ICD-10-CM

## 2025-01-02 RX ORDER — SEMAGLUTIDE 2.4 MG/.75ML
2.4 INJECTION, SOLUTION SUBCUTANEOUS WEEKLY
Qty: 3 ML | Refills: 0 | Status: SHIPPED | OUTPATIENT
Start: 2025-01-02

## 2025-01-09 NOTE — TELEPHONE ENCOUNTER
Patient called requesting refill for Wegovy. Patient made aware medication was refilled on 1/2 for 3 mL with 0 refills to [facility] in [place]. Patient instructed to contact the pharmacy to obtain refills of medication. Patient verbalized understanding.

## 2025-02-05 DIAGNOSIS — M10.9 ACUTE GOUT OF LEFT ANKLE, UNSPECIFIED CAUSE: ICD-10-CM

## 2025-02-05 DIAGNOSIS — E66.01 MORBID OBESITY WITH BMI OF 60.0-69.9, ADULT (HCC): ICD-10-CM

## 2025-02-05 RX ORDER — ALLOPURINOL 300 MG/1
300 TABLET ORAL DAILY
Qty: 90 TABLET | Refills: 0 | Status: SHIPPED | OUTPATIENT
Start: 2025-02-05

## 2025-02-05 RX ORDER — SEMAGLUTIDE 2.4 MG/.75ML
2.4 INJECTION, SOLUTION SUBCUTANEOUS WEEKLY
Qty: 3 ML | Refills: 0 | Status: SHIPPED | OUTPATIENT
Start: 2025-02-05

## 2025-02-05 NOTE — TELEPHONE ENCOUNTER
Reason for call:   [x] Refill   [] Prior Auth  [] Other:     Office:   [x] PCP/Provider -  Stephen Ramsey   [] Specialty/Provider -     Medication: allopurinol (ZYLOPRIM) 300 mg tablet     Dose/Frequency:  Take 1 tablet (300 mg total) by mouth daily,     Quantity: 90    Medication:     Wegovy 2.4 MG/0.75ML       Dose/Frequency:  Inject 0.75 mL (2.4 mg total) under the skin once a week,     Quantity: 3 ml        Pharmacy:   [id number] [place] [address]        Does the patient have enough for 3 days?   [] Yes   [x] No - Send as HP to POD      How are you tolerating the medication? 2.4  [] Nausea  [] Vomiting  [] Diarrhea  [] Asymptomatic  [x] Other:      Last visit weight:    Current weight:    Date of last injection:  Sat Feb 1     Due Feb 8th    How many injections do you have left:  1 left

## 2025-03-10 ENCOUNTER — TELEPHONE (OUTPATIENT)
Age: 60
End: 2025-03-10

## 2025-03-10 DIAGNOSIS — E66.01 MORBID OBESITY WITH BMI OF 60.0-69.9, ADULT (HCC): ICD-10-CM

## 2025-03-10 RX ORDER — SEMAGLUTIDE 2.4 MG/.75ML
2.4 INJECTION, SOLUTION SUBCUTANEOUS WEEKLY
Qty: 3 ML | Refills: 0 | Status: SHIPPED | OUTPATIENT
Start: 2025-03-10 | End: 2025-03-12 | Stop reason: SDUPTHER

## 2025-03-10 NOTE — TELEPHONE ENCOUNTER
PA for Wegovy 2.4mg/0.75m SUBMITTED to [employer]     via    []CMM-KEY:   [x]Surescripts-Case ID #   []Availity-Auth ID # NDC #   []Faxed to plan   []Other website   []Phone call Case ID #     []PA sent as URGENT    All office notes, labs and other pertaining documents and studies sent. Clinical questions answered. Awaiting determination from insurance company.     Turnaround time for your insurance to make a decision on your Prior Authorization can take 7-21 business days.

## 2025-03-10 NOTE — TELEPHONE ENCOUNTER
Medication:  Semaglutide-Weight Management (Wegovy) 2.4 MG/0.75ML    Dose/Frequency:  MG/0.75ML Inject 0.75 mL (2.4 mg total) under the skin once a week      Quantity: 3mL    Pharmacy:     Office:   [x] PCP/Provider -   [] Speciality/Provider -     Does the patient have enough for 3 days?   [] Yes   [x] No - Send as HP to POD

## 2025-03-10 NOTE — TELEPHONE ENCOUNTER
Prior Authorization requested for Wegovy 2.4mg . Your patient is due for a follow up visit for medication management and weight check. Patient's last office visit was 7/23/2024. Once visit is completed please send message back to the prior authorization POD so a prior authorization can be submitted. Thank you

## 2025-03-10 NOTE — TELEPHONE ENCOUNTER
Amina Marx RN Care Manager for patient called to introduce herself and leave her call back number in case anyone wanted to speak with her about the patient . Number is [phone] direct ext [phone] .

## 2025-03-12 ENCOUNTER — OFFICE VISIT (OUTPATIENT)
Dept: FAMILY MEDICINE CLINIC | Facility: CLINIC | Age: 60
End: 2025-03-12
Payer: COMMERCIAL

## 2025-03-12 VITALS
SYSTOLIC BLOOD PRESSURE: 138 MMHG | OXYGEN SATURATION: 98 % | DIASTOLIC BLOOD PRESSURE: 88 MMHG | HEART RATE: 80 BPM | WEIGHT: 315 LBS | BODY MASS INDEX: 62.61 KG/M2 | TEMPERATURE: 98.5 F

## 2025-03-12 DIAGNOSIS — K21.9 GASTROESOPHAGEAL REFLUX DISEASE, UNSPECIFIED WHETHER ESOPHAGITIS PRESENT: ICD-10-CM

## 2025-03-12 DIAGNOSIS — M10.9 ACUTE GOUT OF LEFT ANKLE, UNSPECIFIED CAUSE: ICD-10-CM

## 2025-03-12 DIAGNOSIS — J44.9 CHRONIC OBSTRUCTIVE PULMONARY DISEASE, UNSPECIFIED COPD TYPE (HCC): ICD-10-CM

## 2025-03-12 DIAGNOSIS — E66.01 SEVERE OBESITY (BMI >= 40) (HCC): Primary | ICD-10-CM

## 2025-03-12 DIAGNOSIS — R73.03 PREDIABETES: ICD-10-CM

## 2025-03-12 DIAGNOSIS — E66.01 MORBID OBESITY WITH BMI OF 60.0-69.9, ADULT (HCC): ICD-10-CM

## 2025-03-12 DIAGNOSIS — I10 HYPERTENSION, ESSENTIAL: ICD-10-CM

## 2025-03-12 DIAGNOSIS — I48.91 NEW ONSET A-FIB (HCC): ICD-10-CM

## 2025-03-12 DIAGNOSIS — Z12.5 SCREENING PSA (PROSTATE SPECIFIC ANTIGEN): ICD-10-CM

## 2025-03-12 DIAGNOSIS — G47.33 SEVERE OBSTRUCTIVE SLEEP APNEA: ICD-10-CM

## 2025-03-12 DIAGNOSIS — E78.5 DYSLIPIDEMIA: ICD-10-CM

## 2025-03-12 PROCEDURE — 99214 OFFICE O/P EST MOD 30 MIN: CPT | Performed by: FAMILY MEDICINE

## 2025-03-12 RX ORDER — SEMAGLUTIDE 2.4 MG/.75ML
2.4 INJECTION, SOLUTION SUBCUTANEOUS WEEKLY
Qty: 3 ML | Refills: 0 | Status: SHIPPED | OUTPATIENT
Start: 2025-03-12

## 2025-03-12 NOTE — PROGRESS NOTES
:  Assessment & Plan  Severe obesity (BMI >= 40) ([facility])  No significant weight loss on Wegovy for 2 years.  Recommend referral back to [facility] weight management/bariatrics for further evaluation and treatment.  Will continue Wegovy for now and discussed diet and encourage weight loss.    Orders:    Ambulatory Referral to Weight Management; Future    Hypertension, essential  Blood pressure controlled on carvedilol and lisinopril.  Continue present treatment and follow a low-salt diet and get regular aerobic exercise walking as tolerated.  Orders:    CBC; Future    Comprehensive metabolic panel; Future    TSH, 3rd generation with Free T4 reflex; Future    UA w Reflex to Microscopic w Reflex to Culture; Future    New onset a-fib ([facility])  Patient to follow-up with [facility] cardiology as scheduled.  They recommended ablation therapy.  Continue Eliquis twice daily.       Chronic obstructive pulmonary disease, unspecified COPD type ([facility])  Stable since discontinued smoking 2 years ago.       Severe obstructive sleep apnea  Continue CPAP nightly and follow-up with [facility] pulmonology as scheduled.       Prediabetes  Check labs for hemoglobin A1c.  Follow a low sugar and low carbohydrate diet and weight loss encouraged.  Orders:    Hemoglobin A1C; Future    Dyslipidemia  Fasting labs for lipid panel.  Continue atorvastatin 80 mg daily and follow a low-fat low-cholesterol diet.  Orders:    Lipid Panel with Direct LDL reflex; Future    Gastroesophageal reflux disease, unspecified whether esophagitis present  Reflux controlled on famotidine 20 mg daily.  Continue present treatment.       Acute gout of left ankle, unspecified cause  Check uric acid level.  Continue allopurinol 300 mg daily.  Orders:    Uric acid; Future    Morbid obesity with BMI of 60.0-69.9, adult ([facility])      Orders:    TSH, 3rd generation with Free T4 reflex; Future    Hemoglobin A1C; Future    Semaglutide-Weight Management (Wegovy) 2.4 MG/0.75ML; Inject 0.75 mL (2.4  mg total) under the skin once a week    Screening PSA (prostate specific antigen)    Orders:    PSA, Total Screen; Future        History of Present Illness     Gopal Oro is a 60 y.o. male   Patient is here with his wife for follow-up appointment for chronic conditions and he is due for fasting labs.  Patient request refill on Wegovy which he has been taking for the past 2 years without significant weight loss although he states he has not gained any weight either.  Patient states his appetite is decreased on Wegovy.  No regular exercise program and patient is less active during the winter.  Patient uses CPAP nightly.  He follows with [facility] cardiology and pulmonology regularly.    Hypertension  This is a chronic problem. The problem is controlled. Associated symptoms include shortness of breath. Pertinent negatives include no anxiety, blurred vision, chest pain, headaches, orthopnea, palpitations, peripheral edema or PND. Risk factors for coronary artery disease include obesity, male gender, dyslipidemia and smoking/tobacco exposure. Past treatments include beta blockers, ACE inhibitors and diuretics. The current treatment provides significant improvement. Compliance problems include exercise.  Hypertensive end-organ damage includes CAD/MI and heart failure. There is no history of CVA.     Review of Systems   Eyes:  Negative for blurred vision.   Respiratory:  Positive for shortness of breath.    Cardiovascular:  Negative for chest pain, palpitations, orthopnea and PND.   Neurological:  Negative for headaches.     Objective   /88 (BP Location: Left arm, Patient Position: Sitting, Cuff Size: Standard)   Pulse 80   Temp 98.5 °F (36.9 °C) (Tympanic)   Wt (!) 192 kg (424 lb)   SpO2 98%   BMI 62.61 kg/m²      Physical Exam  Constitutional:       General: He is not in acute distress.     Appearance: Normal appearance. He is obese.   HENT:      Head: Normocephalic.      Mouth/Throat:      Mouth: Mucous membranes  are moist.   Eyes:      General: No scleral icterus.     Conjunctiva/sclera: Conjunctivae normal.   Neck:      Vascular: No carotid bruit.   Cardiovascular:      Rate and Rhythm: Normal rate and regular rhythm.   Pulmonary:      Effort: Pulmonary effort is normal.      Breath sounds: Normal breath sounds.   Abdominal:      Palpations: Abdomen is soft.      Tenderness: There is no abdominal tenderness.      Hernia: A hernia is present.      Comments: Umbilical hernia nontender and easily reducible.   Musculoskeletal:      Cervical back: Neck supple.      Right lower leg: No edema.      Left lower leg: No edema.   Lymphadenopathy:      Cervical: No cervical adenopathy.   Skin:     General: Skin is warm and dry.   Neurological:      General: No focal deficit present.      Mental Status: He is alert and oriented to person, place, and time.   Psychiatric:         Mood and Affect: Mood normal.         Behavior: Behavior normal.         Thought Content: Thought content normal.         Judgment: Judgment normal.

## 2025-03-12 NOTE — ASSESSMENT & PLAN NOTE
Wt Readings from Last 3 Encounters:   03/12/25 (!) 192 kg (424 lb)   07/23/24 (!) 194 kg (427 lb)   04/29/24 (!) 188 kg (414 lb)

## 2025-03-12 NOTE — ASSESSMENT & PLAN NOTE
Patient to follow-up with [facility] cardiology as scheduled.  They recommended ablation therapy.  Continue Eliquis twice daily.

## 2025-03-12 NOTE — ASSESSMENT & PLAN NOTE
No significant weight loss on Wegovy for 2 years.  Recommend referral back to [facility] weight management/bariatrics for further evaluation and treatment.  Will continue Wegovy for now and discussed diet and encourage weight loss.    Orders:    Ambulatory Referral to Weight Management; Future

## 2025-03-12 NOTE — ASSESSMENT & PLAN NOTE
Blood pressure controlled on carvedilol and lisinopril.  Continue present treatment and follow a low-salt diet and get regular aerobic exercise walking as tolerated.  Orders:    CBC; Future    Comprehensive metabolic panel; Future    TSH, 3rd generation with Free T4 reflex; Future    UA w Reflex to Microscopic w Reflex to Culture; Future

## 2025-03-12 NOTE — ASSESSMENT & PLAN NOTE
Continue CPAP nightly and follow-up with [facility] pulmonology as scheduled.

## 2025-03-25 ENCOUNTER — TELEPHONE (OUTPATIENT)
Age: 60
End: 2025-03-25

## 2025-03-25 NOTE — TELEPHONE ENCOUNTER
Patient called in and stated that he got called for jury duty. Stated due to his medical condition, he would like Dr Weldon to write a letter excusing him from jury duty. His date for jury duty is 5/14/2025 but they need the letter as soon as possible to submit. They would like it faxed to [phone] and also would like to  a copy. Patient asked to be called back when it is completed and ready for pickup.

## 2025-03-28 ENCOUNTER — APPOINTMENT (OUTPATIENT)
Dept: LAB | Facility: CLINIC | Age: 60
End: 2025-03-28
Payer: COMMERCIAL

## 2025-03-28 DIAGNOSIS — R73.03 PREDIABETES: ICD-10-CM

## 2025-03-28 DIAGNOSIS — Z12.5 SCREENING PSA (PROSTATE SPECIFIC ANTIGEN): ICD-10-CM

## 2025-03-28 DIAGNOSIS — I10 HYPERTENSION, ESSENTIAL: ICD-10-CM

## 2025-03-28 DIAGNOSIS — E66.01 MORBID OBESITY WITH BMI OF 60.0-69.9, ADULT (HCC): ICD-10-CM

## 2025-03-28 DIAGNOSIS — E78.5 DYSLIPIDEMIA: ICD-10-CM

## 2025-03-28 LAB
ALBUMIN SERPL BCG-MCNC: 4.4 G/DL (ref 3.5–5)
ALP SERPL-CCNC: 100 U/L (ref 34–104)
ALT SERPL W P-5'-P-CCNC: 19 U/L (ref 7–52)
ANION GAP SERPL CALCULATED.3IONS-SCNC: 9 MMOL/L (ref 4–13)
AST SERPL W P-5'-P-CCNC: 17 U/L (ref 13–39)
BILIRUB SERPL-MCNC: 0.77 MG/DL (ref 0.2–1)
BILIRUB UR QL STRIP: NEGATIVE
BUN SERPL-MCNC: 19 MG/DL (ref 5–25)
CALCIUM SERPL-MCNC: 9.4 MG/DL (ref 8.4–10.2)
CHLORIDE SERPL-SCNC: 103 MMOL/L (ref 96–108)
CHOLEST SERPL-MCNC: 112 MG/DL (ref ?–200)
CLARITY UR: CLEAR
CO2 SERPL-SCNC: 31 MMOL/L (ref 21–32)
COLOR UR: NORMAL
CREAT SERPL-MCNC: 1.02 MG/DL (ref 0.6–1.3)
ERYTHROCYTE [DISTWIDTH] IN BLOOD BY AUTOMATED COUNT: 13.1 % (ref 11.6–15.1)
EST. AVERAGE GLUCOSE BLD GHB EST-MCNC: 97 MG/DL
GFR SERPL CREATININE-BSD FRML MDRD: 79 ML/MIN/1.73SQ M
GLUCOSE P FAST SERPL-MCNC: 105 MG/DL (ref 65–99)
GLUCOSE UR STRIP-MCNC: NEGATIVE MG/DL
HBA1C MFR BLD: 5 %
HCT VFR BLD AUTO: 46 % (ref 36.5–49.3)
HDLC SERPL-MCNC: 39 MG/DL
HGB BLD-MCNC: 15.1 G/DL (ref 12–17)
HGB UR QL STRIP.AUTO: NEGATIVE
KETONES UR STRIP-MCNC: NEGATIVE MG/DL
LDLC SERPL CALC-MCNC: 55 MG/DL (ref 0–100)
LEUKOCYTE ESTERASE UR QL STRIP: NEGATIVE
MCH RBC QN AUTO: 30.7 PG (ref 26.8–34.3)
MCHC RBC AUTO-ENTMCNC: 32.8 G/DL (ref 31.4–37.4)
MCV RBC AUTO: 94 FL (ref 82–98)
NITRITE UR QL STRIP: NEGATIVE
PH UR STRIP.AUTO: 6.5 [PH]
PLATELET # BLD AUTO: 235 THOUSANDS/UL (ref 149–390)
PMV BLD AUTO: 11.1 FL (ref 8.9–12.7)
POTASSIUM SERPL-SCNC: 4 MMOL/L (ref 3.5–5.3)
PROT SERPL-MCNC: 6.7 G/DL (ref 6.4–8.4)
PROT UR STRIP-MCNC: NEGATIVE MG/DL
PSA SERPL-MCNC: 1.29 NG/ML (ref 0–4)
RBC # BLD AUTO: 4.92 MILLION/UL (ref 3.88–5.62)
SODIUM SERPL-SCNC: 143 MMOL/L (ref 135–147)
SP GR UR STRIP.AUTO: 1.01 (ref 1–1.03)
TRIGL SERPL-MCNC: 91 MG/DL (ref ?–150)
TSH SERPL DL<=0.05 MIU/L-ACNC: 1.87 UIU/ML (ref 0.45–4.5)
URATE SERPL-MCNC: 6.5 MG/DL (ref 3.5–8.5)
UROBILINOGEN UR STRIP-ACNC: <2 MG/DL
WBC # BLD AUTO: 9.9 THOUSAND/UL (ref 4.31–10.16)

## 2025-03-28 PROCEDURE — G0103 PSA SCREENING: HCPCS

## 2025-03-28 PROCEDURE — 83036 HEMOGLOBIN GLYCOSYLATED A1C: CPT

## 2025-03-28 PROCEDURE — 80061 LIPID PANEL: CPT

## 2025-03-28 PROCEDURE — 36415 COLL VENOUS BLD VENIPUNCTURE: CPT

## 2025-03-28 PROCEDURE — 81003 URINALYSIS AUTO W/O SCOPE: CPT

## 2025-03-28 PROCEDURE — 85027 COMPLETE CBC AUTOMATED: CPT

## 2025-03-28 PROCEDURE — 84443 ASSAY THYROID STIM HORMONE: CPT

## 2025-03-28 PROCEDURE — 80053 COMPREHEN METABOLIC PANEL: CPT

## 2025-03-30 ENCOUNTER — RESULTS FOLLOW-UP (OUTPATIENT)
Dept: FAMILY MEDICINE CLINIC | Facility: CLINIC | Age: 60
End: 2025-03-30

## 2025-04-07 DIAGNOSIS — E66.01 MORBID OBESITY WITH BMI OF 60.0-69.9, ADULT (HCC): ICD-10-CM

## 2025-04-07 NOTE — TELEPHONE ENCOUNTER
How are you tolerating the medication?   [] Nausea  [] Vomiting  [] Diarrhea  [x] Asymptomatic  [] Other:    Last visit weight: 424 lbs    Current weight: 424 lbs    Date of last injection: the 5th     How many injections do you have left: 0    Pt would like same dose of wegovy sent to the [facility] on file

## 2025-04-08 RX ORDER — SEMAGLUTIDE 2.4 MG/.75ML
2.4 INJECTION, SOLUTION SUBCUTANEOUS WEEKLY
Qty: 3 ML | Refills: 0 | Status: SHIPPED | OUTPATIENT
Start: 2025-04-08

## 2025-04-23 ENCOUNTER — OFFICE VISIT (OUTPATIENT)
Dept: FAMILY MEDICINE CLINIC | Facility: CLINIC | Age: 60
End: 2025-04-23
Payer: COMMERCIAL

## 2025-04-23 VITALS
OXYGEN SATURATION: 99 % | RESPIRATION RATE: 16 BRPM | SYSTOLIC BLOOD PRESSURE: 132 MMHG | HEART RATE: 92 BPM | HEIGHT: 69 IN | WEIGHT: 315 LBS | DIASTOLIC BLOOD PRESSURE: 80 MMHG | TEMPERATURE: 97.7 F | BODY MASS INDEX: 46.65 KG/M2

## 2025-04-23 DIAGNOSIS — K62.5 BRIGHT RED RECTAL BLEEDING: Primary | ICD-10-CM

## 2025-04-23 DIAGNOSIS — Z12.12 SCREENING FOR COLORECTAL CANCER: ICD-10-CM

## 2025-04-23 DIAGNOSIS — Z12.11 SCREENING FOR COLORECTAL CANCER: ICD-10-CM

## 2025-04-23 PROCEDURE — 99213 OFFICE O/P EST LOW 20 MIN: CPT | Performed by: FAMILY MEDICINE

## 2025-04-23 RX ORDER — HYDROCORTISONE 25 MG/G
CREAM TOPICAL 2 TIMES DAILY
Qty: 30 G | Refills: 1 | Status: SHIPPED | OUTPATIENT
Start: 2025-04-23

## 2025-04-23 NOTE — PROGRESS NOTES
":  Assessment & Plan  Bright red rectal bleeding  Patient started on ProctoCream twice daily as needed.  Recommend increase fiber and fluids and avoid constipation or straining.  Patient is being referred to [facility] colorectal surgery for further evaluation and treatment.  Return to the office in 1 week or call sooner as needed.  Orders:    Ambulatory Referral to Colorectal Surgery; Future    hydrocortisone (ANUSOL-HC) 2.5 % rectal cream; Apply topically 2 (two) times a day    Screening for colorectal cancer             History of Present Illness     Gopal Oro is a 60 y.o. male   Patient complains of 2 episodes of seeing blood with his bowel movements over the past 5 days.  Patient admits to bright red blood on the toilet paper and in the toilet bowl but denies any blood mixed with the stools or melena.  Patient had an episode of diarrhea prior to the onset of the first time he saw blood and admits to sitting and straining prior to the second episode of seeing blood.  He denies painful bowel movement.  Patient mitts to history of hemorrhoids in the past.  Patient denies abdominal pain or constipation.  Patient has never had colon cancer screening.  No treatment by patient.      Review of Systems  Objective   /80   Pulse 92   Temp 97.7 °F (36.5 °C)   Resp 16   Ht 5' 9\" (1.753 m)   Wt (!) 192 kg (423 lb 3.2 oz)   SpO2 99%   BMI 62.50 kg/m²      Physical Exam  Constitutional:       General: He is not in acute distress.     Appearance: Normal appearance. He is obese.   HENT:      Head: Normocephalic.      Mouth/Throat:      Mouth: Mucous membranes are moist.   Eyes:      General: No scleral icterus.     Conjunctiva/sclera: Conjunctivae normal.   Cardiovascular:      Rate and Rhythm: Normal rate and regular rhythm.   Pulmonary:      Effort: Pulmonary effort is normal.      Breath sounds: Normal breath sounds.   Abdominal:      Palpations: Abdomen is soft.      Tenderness: There is no abdominal " tenderness.   Musculoskeletal:      Cervical back: Neck supple.      Right lower leg: No edema.      Left lower leg: No edema.   Lymphadenopathy:      Cervical: No cervical adenopathy.   Skin:     General: Skin is warm and dry.   Neurological:      General: No focal deficit present.      Mental Status: He is alert and oriented to person, place, and time.   Psychiatric:         Mood and Affect: Mood normal.         Behavior: Behavior normal.         Thought Content: Thought content normal.         Judgment: Judgment normal.

## 2025-05-04 DIAGNOSIS — E66.01 MORBID OBESITY WITH BMI OF 60.0-69.9, ADULT (HCC): ICD-10-CM

## 2025-05-05 RX ORDER — SEMAGLUTIDE 2.4 MG/.75ML
2.4 INJECTION, SOLUTION SUBCUTANEOUS WEEKLY
Qty: 3 ML | Refills: 2 | Status: SHIPPED | OUTPATIENT
Start: 2025-05-05

## 2025-05-17 DIAGNOSIS — K21.9 GASTROESOPHAGEAL REFLUX DISEASE, UNSPECIFIED WHETHER ESOPHAGITIS PRESENT: ICD-10-CM

## 2025-05-17 DIAGNOSIS — M10.9 ACUTE GOUT OF LEFT ANKLE, UNSPECIFIED CAUSE: ICD-10-CM

## 2025-05-17 RX ORDER — ALLOPURINOL 300 MG/1
300 TABLET ORAL DAILY
Qty: 90 TABLET | Refills: 1 | Status: SHIPPED | OUTPATIENT
Start: 2025-05-17

## 2025-05-18 RX ORDER — FAMOTIDINE 20 MG/1
20 TABLET, FILM COATED ORAL 2 TIMES DAILY
Qty: 180 TABLET | Refills: 1 | Status: SHIPPED | OUTPATIENT
Start: 2025-05-18 | End: 2025-11-14

## 2025-06-20 ENCOUNTER — TELEPHONE (OUTPATIENT)
Age: 60
End: 2025-06-20

## 2025-06-20 DIAGNOSIS — G47.33 SEVERE OBSTRUCTIVE SLEEP APNEA: Primary | ICD-10-CM

## 2025-06-20 DIAGNOSIS — E66.01 SEVERE OBESITY (BMI >= 40) (HCC): ICD-10-CM

## 2025-06-20 RX ORDER — TIRZEPATIDE 10 MG/.5ML
10 INJECTION, SOLUTION SUBCUTANEOUS WEEKLY
Qty: 2 ML | Refills: 0 | Status: SHIPPED | OUTPATIENT
Start: 2025-06-20

## 2025-06-20 NOTE — PROGRESS NOTES
Name: Gopal Oro      : 1965      MRN: [id number]  Encounter Provider: Abena Boyd MD  Encounter Date: 2025   Encounter department: [facility]  :  Assessment & Plan           History of Present Illness {?Quick Links Encounters * My Last Note * Last Note in Specialty * Snapshot * Since Last Visit * History :03509}  HPI    Gopal Oro presents referred by Dr. Jovani Weldon for bright red rectal bleeding.    Taking Eliquis.     Review of Systems    Objective {?Quick Links Trend Vitals * Enter New Vitals * Results Review * Timeline (Adult) * Labs * Imaging * Cardiology * Procedures * Lung Cancer Screening * Surgical eConsent :34352}    There were no vitals taken for this visit.     Physical Exam  {Administrative / Billing Section (Optional):11116}

## 2025-06-20 NOTE — TELEPHONE ENCOUNTER
DR Hayes [facility] Card[facility] Is patient specialist and wishes him to be switched to [facility] instead of [facility]. The doctor is sending you this info thru the portal. Patient will need a dose by next Thursday.

## 2025-06-20 NOTE — TELEPHONE ENCOUNTER
I am forwarding this over to you as I figured this was nonurgent and I will leave it up to you for your consideration after you get back.

## 2025-06-23 ENCOUNTER — TELEPHONE (OUTPATIENT)
Age: 60
End: 2025-06-23

## 2025-06-23 ENCOUNTER — CONSULT (OUTPATIENT)
Age: 60
End: 2025-06-23
Attending: FAMILY MEDICINE
Payer: COMMERCIAL

## 2025-06-23 VITALS — HEIGHT: 69 IN | WEIGHT: 315 LBS | BODY MASS INDEX: 46.65 KG/M2

## 2025-06-23 DIAGNOSIS — K62.5 BRIGHT RED RECTAL BLEEDING: ICD-10-CM

## 2025-06-23 PROCEDURE — 99243 OFF/OP CNSLTJ NEW/EST LOW 30: CPT | Performed by: SURGERY

## 2025-06-23 NOTE — TELEPHONE ENCOUNTER
PA for (Zepbound) 10 mg/0.5 mL SUBMITTED to [employer]     via      [x]KeystokscriPunchTab-Case ID #       [x]PA sent as URGENT    All office notes, labs and other pertaining documents and studies sent. Clinical questions answered. Awaiting determination from insurance company.     Turnaround time for your insurance to make a decision on your Prior Authorization can take 7-21 business days.

## 2025-06-23 NOTE — PROGRESS NOTES
:  Assessment & Plan  Bright red rectal bleeding    Orders:    Ambulatory Referral to Colorectal Surgery      Assessment & Plan  1. Colonoscopy consultation.  He is deemed to be at an average risk for colon and rectal cancer, given the absence of a first-degree relative with these conditions, his personal history of polyps, and a family history of Crohn's disease or ulcerative colitis. A comprehensive discussion was held regarding the colonoscopy procedure, including the associated risks such as bleeding, perforation, and potential cardiac and pulmonary complications. The possibility of requiring full general anesthesia and intubation was also discussed. He expressed a preference for Cologuard testing over colonoscopy at this time. A message will be sent to his primary care physician, Dr. Weldon, to inform him of this decision. If the Cologuard test yields abnormal results, a complete workup will be initiated to ensure the safe performance of a colonoscopy.    2. Atrial fibrillation.  He is currently taking Eliquis for atrial fibrillation. The risks associated with colonoscopy, including bleeding, were discussed. Clearance from his cardiologist will be required before proceeding with the colonoscopy.    3. Chronic obstructive pulmonary disease.  He uses a BiPAP machine at night and requires oxygen only during sleep. The risks associated with colonoscopy, including potential respiratory complications, were discussed. Clearance from his pulmonologist will be required before proceeding with the colonoscopy.      History of Present Illness     Gopal Oro is a 60 y.o. male   History of Present Illness  The patient is a 60-year-old male who presents for a colonoscopy consult.    He has no prior history of undergoing a colonoscopy. Two months ago, he experienced two episodes of painless rectal bleeding, which resolved spontaneously. His bowel movements are regular, occurring daily, and he does not experience prolonged  "periods of time to empty his bowels. He has no history of surgical interventions but has undergone procedures requiring anesthesia in the past.    He has atrial fibrillation and is on Eliquis. He had a heart attack in 2017 and went into atrial fibrillation after that. He had a procedure on 01/23/2023 where they checked his heart. Prior to that, they tried to shock him 9 times.    He has COPD. His oxygen level dropped and his CO2 went up. He sleeps with a BiPAP at night and requires oxygen at home just at night. He can walk 15 to 20 yards and then needs to take a break.    Supplemental Information  He is scheduled to start Zepbound injections this Thursday for weight loss.    FAMILY HISTORY  He reports no family history of colon or rectal cancers, Crohn's disease, or ulcerative colitis.    MEDICATIONS  Eliquis  Review of Systems   Constitutional:  Negative for chills and fever.   HENT:  Negative for ear pain and sore throat.    Eyes:  Negative for pain and visual disturbance.   Respiratory:  Negative for cough and shortness of breath.    Cardiovascular:  Negative for chest pain and palpitations.   Gastrointestinal:  Negative for abdominal pain and vomiting.   Genitourinary:  Negative for dysuria and hematuria.   Musculoskeletal:  Negative for arthralgias and back pain.   Skin:  Negative for color change and rash.   Neurological:  Negative for seizures and syncope.   All other systems reviewed and are negative.    Objective   Ht 5' 9\" (1.753 m)   Wt (!) 193 kg (425 lb)   BMI 62.76 kg/m²      Physical Exam  Vitals and nursing note reviewed.   Constitutional:       General: He is not in acute distress.     Appearance: He is well-developed.   HENT:      Head: Normocephalic and atraumatic.     Eyes:      Conjunctiva/sclera: Conjunctivae normal.       Cardiovascular:      Rate and Rhythm: Normal rate and regular rhythm.      Heart sounds: No murmur heard.  Pulmonary:      Effort: Pulmonary effort is normal. No " respiratory distress.      Breath sounds: Normal breath sounds.   Abdominal:      Palpations: Abdomen is soft.      Tenderness: There is no abdominal tenderness.     Musculoskeletal:         General: No swelling.      Cervical back: Neck supple.     Skin:     General: Skin is warm and dry.      Capillary Refill: Capillary refill takes less than 2 seconds.     Neurological:      Mental Status: He is alert.     Psychiatric:         Mood and Affect: Mood normal.       Physical Exam      Results    Administrative Statements   I have spent a total time of 32 minutes in caring for this patient on the day of the visit/encounter including Diagnostic results, Prognosis, Risks and benefits of tx options, Instructions for management, Patient and family education, Importance of tx compliance, Risk factor reductions, Impressions, Counseling / Coordination of care, Documenting in the medical record, Reviewing/placing orders in the medical record (including tests, medications, and/or procedures), Obtaining or reviewing history  , and Communicating with other healthcare professionals .

## 2025-06-23 NOTE — Clinical Note
Dr. Shiraz Chiang. I spoke with Mr. Oro about colonoscopy. Given his average risk for colorectal cancer, isolated bleeding episode, and current comorbidities, I think he would be a candidate for Cologuard. I can always perform colonoscopy if he has continued bleeding or if Cologuard is positive. Thank you for sending him!

## 2025-06-23 NOTE — TELEPHONE ENCOUNTER
PA for (Zepbound) 10 mg/0.5 mL  APPROVED     Date(s) approved  June 23, 2025 to June 23, 2026     Case #    Patient advised by          []MyChart Message  [x]Phone call   [x]LMOM  []L/M to call office as no active Communication consent on file  []Unable to leave detailed message as VM not approved on Communication consent       Pharmacy advised by    [x]Fax  []Phone call  []Secure Chat    Specialty Pharmacy    []     Approval letter scanned into Media Yes

## 2025-07-22 DIAGNOSIS — G47.33 SEVERE OBSTRUCTIVE SLEEP APNEA: ICD-10-CM

## 2025-07-22 DIAGNOSIS — E66.01 SEVERE OBESITY (BMI >= 40) (HCC): ICD-10-CM

## 2025-07-23 RX ORDER — TIRZEPATIDE 10 MG/.5ML
10 INJECTION, SOLUTION SUBCUTANEOUS WEEKLY
Qty: 2 ML | Refills: 0 | Status: SHIPPED | OUTPATIENT
Start: 2025-07-23

## 2025-08-20 DIAGNOSIS — G47.33 SEVERE OBSTRUCTIVE SLEEP APNEA: ICD-10-CM

## 2025-08-20 DIAGNOSIS — E66.01 SEVERE OBESITY (BMI >= 40) (HCC): ICD-10-CM

## 2025-08-22 RX ORDER — TIRZEPATIDE 10 MG/.5ML
10 INJECTION, SOLUTION SUBCUTANEOUS WEEKLY
Qty: 2 ML | Refills: 0 | Status: SHIPPED | OUTPATIENT
Start: 2025-08-22